# Patient Record
Sex: FEMALE | Race: WHITE | NOT HISPANIC OR LATINO | Employment: FULL TIME | URBAN - METROPOLITAN AREA
[De-identification: names, ages, dates, MRNs, and addresses within clinical notes are randomized per-mention and may not be internally consistent; named-entity substitution may affect disease eponyms.]

---

## 2019-02-04 ENCOUNTER — TELEPHONE (OUTPATIENT)
Dept: GASTROENTEROLOGY | Facility: CLINIC | Age: 24
End: 2019-02-04

## 2019-02-04 NOTE — TELEPHONE ENCOUNTER
VMM from unidentified female stating alling from Rx [facility]; states Pt is Hep C positive/over 12 wks post Harvoni tx; has ques about labs completed; req CB to [phone] or can fax to [phone]

## 2019-02-05 NOTE — TELEPHONE ENCOUNTER
I left msg on [phone] that patient should complete Hep C Viral Load lab test as ordered previously, if she needs a new lab order to contact the office

## 2019-04-18 LAB
HCV RNA SERPL NAA+PROBE-ACNC: NORMAL IU/ML
TEST INFORMATION: NORMAL

## 2021-02-17 LAB — HCV AB SER-ACNC: >11

## 2021-03-25 ENCOUNTER — TRANSCRIBE ORDERS (OUTPATIENT)
Dept: PERINATAL CARE | Facility: CLINIC | Age: 26
End: 2021-03-25

## 2021-03-25 DIAGNOSIS — O09.899 SUPERVISION OF OTHER HIGH RISK PREGNANCIES, UNSPECIFIED TRIMESTER: Primary | ICD-10-CM

## 2021-03-31 ENCOUNTER — TELEPHONE (OUTPATIENT)
Dept: GASTROENTEROLOGY | Facility: CLINIC | Age: 26
End: 2021-03-31

## 2021-03-31 NOTE — TELEPHONE ENCOUNTER
FELIX Mccloud, left [facility] requesting rec to be fax'd to [phone]  If Shelby Pitch [phone]  Called for more info/spoke w/ nurse Tanya Reid  I noted receipt of Ame's req for Med records/asked what she is looking for  Tanya Reid states Pt is pregnant again/want to discuss Hep C treatment

## 2021-04-01 NOTE — TELEPHONE ENCOUNTER
I tried calling Lindsay Kang [phone], she was not available  Clem Rosas is now on [employer]; new fax number [phone]  I tried faxing updated clinical note & lab results, but have been unsuccessful  Her last HCV lab result dated 4/13/2019- not detected  Patient completed 8 weeks of [address] for Hep C in 2018  Her follow up viral load was completed 3 months after treatment  Results showed not detected meaning that she is considered a cure of the Hep C  [place] ordered standard pregnancy blood work which included Hep C Ab; it came back positive  To ensure she has no active virus it would be recommended to follow up with a HCV PCR Quantitative

## 2021-04-02 ENCOUNTER — TELEPHONE (OUTPATIENT)
Dept: OBGYN CLINIC | Facility: CLINIC | Age: 26
End: 2021-04-02

## 2021-04-02 NOTE — TELEPHONE ENCOUNTER
Received call back from Javon at [facility], [facility]  Yifan Townsend last appt at [facility] was 4/3/2019  HCV lab result at that time hep c not detected, cleared her system   If you feel further testing is indicated they recommend you order HCV PCR quant to check for current infection

## 2021-04-02 NOTE — TELEPHONE ENCOUNTER
I left a  [phone] for the nursing staff trying to provide them with update as my fax is not going through

## 2021-04-20 ENCOUNTER — OFFICE VISIT (OUTPATIENT)
Dept: OBGYN CLINIC | Facility: CLINIC | Age: 26
End: 2021-04-20
Payer: COMMERCIAL

## 2021-04-20 VITALS
DIASTOLIC BLOOD PRESSURE: 72 MMHG | SYSTOLIC BLOOD PRESSURE: 112 MMHG | HEIGHT: 63 IN | TEMPERATURE: 98.8 F | BODY MASS INDEX: 30.12 KG/M2 | WEIGHT: 170 LBS

## 2021-04-20 DIAGNOSIS — R22.41 ANKLE MASS, RIGHT: Primary | ICD-10-CM

## 2021-04-20 PROCEDURE — 99203 OFFICE O/P NEW LOW 30 MIN: CPT | Performed by: ORTHOPAEDIC SURGERY

## 2021-04-20 NOTE — ASSESSMENT & PLAN NOTE
Findings consistent with right lateral ankle mass, possible ganglion cyst   Findings and treatment options were discussed with the patient  She was unable to get x-rays today due to being pregnant  Recommend MRI of the right ankle to further evaluate if the mass is cystic versus soft tissue mass  She will follow-up with MRI results and Dr Leonarda Hancock will go over further treatment recommendations at that time  All questions were answered to patient's satisfaction
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
## 2021-04-21 ENCOUNTER — TELEPHONE (OUTPATIENT)
Dept: OBGYN CLINIC | Facility: CLINIC | Age: 26
End: 2021-04-21

## 2021-04-21 ENCOUNTER — ROUTINE PRENATAL (OUTPATIENT)
Dept: PERINATAL CARE | Facility: CLINIC | Age: 26
End: 2021-04-21
Payer: COMMERCIAL

## 2021-04-21 VITALS
SYSTOLIC BLOOD PRESSURE: 99 MMHG | BODY MASS INDEX: 30.3 KG/M2 | WEIGHT: 171 LBS | HEART RATE: 60 BPM | HEIGHT: 63 IN | DIASTOLIC BLOOD PRESSURE: 67 MMHG

## 2021-04-21 DIAGNOSIS — O02.1 MISSED ABORTION WITH FETAL DEMISE BEFORE 20 COMPLETED WEEKS OF GESTATION: ICD-10-CM

## 2021-04-21 DIAGNOSIS — R22.41 ANKLE MASS, RIGHT: Primary | ICD-10-CM

## 2021-04-21 DIAGNOSIS — Z36.86 ENCOUNTER FOR ANTENATAL SCREENING FOR CERVICAL LENGTH: ICD-10-CM

## 2021-04-21 DIAGNOSIS — Z3A.20 20 WEEKS GESTATION OF PREGNANCY: ICD-10-CM

## 2021-04-21 DIAGNOSIS — O34.211 MATERNAL CARE DUE TO LOW TRANSVERSE UTERINE SCAR FROM PREVIOUS CESAREAN DELIVERY: Primary | ICD-10-CM

## 2021-04-21 DIAGNOSIS — R76.8 HEPATITIS C ANTIBODY POSITIVE IN BLOOD: ICD-10-CM

## 2021-04-21 DIAGNOSIS — O09.899 SUPERVISION OF OTHER HIGH RISK PREGNANCIES, UNSPECIFIED TRIMESTER: ICD-10-CM

## 2021-04-21 PROCEDURE — 76817 TRANSVAGINAL US OBSTETRIC: CPT | Performed by: OBSTETRICS & GYNECOLOGY

## 2021-04-21 PROCEDURE — 99242 OFF/OP CONSLTJ NEW/EST SF 20: CPT | Performed by: OBSTETRICS & GYNECOLOGY

## 2021-04-21 PROCEDURE — 76811 OB US DETAILED SNGL FETUS: CPT | Performed by: OBSTETRICS & GYNECOLOGY

## 2021-04-21 NOTE — PROGRESS NOTES
OFFICE CONSULT  [address] [place] [address] [place] [address],  [address] [place] [address]     Dear Dr Nikki Galloway     Thank you for requesting a  consultation on your patient Jerrod Oliva for the following indications:  20 week anatomy scan    History   Berna Stone is on prenatal vitamins and reports no known drug allergies  Her medical history includes prior history of drug abuse under remission  She also has a have a history of hepatitis C treated in the past and now has a negative viral load  Lastly she has a history of asthma currently controlled on no medication and history of bipolar depression and anxiety again currently controlled on no medication  Surgical history includes prior low-transverse  and mouth surgery  She is a former smoker and a former user of cocaine, marijuana, suboxone and heroin which is currently under remission  A sequential screen was done in this pregnancy is was reported to me as normal by Dr Nikki Galloway  A urine drug screen this pregnancy was negative  Blood type is O negative with a negative antibody screen  Ultrasound findings: Her ultrasound today shows a fetus that is lagging behind her dates and is currently nonviable without a heart rate  This appears to have occurred relatively recently because the internal structures of the cranial anatomy are still visualized and appear normal   There is some edema behind the back of the baby's neck which can be seen in pregnancies in which there is a demise  There is adequate amniotic fluid  Her cervical length is long  There is no sign of placenta previa  There is no sign of an abruption  The fetus measures 17 weeks and 3 days  The patient was informed of the findings and counseled about the limitations of the exam in detecting all forms of fetal congenital abnormalities  She does not report any vaginal bleeding or uterine cramping/contractions  She does feel fetal movement        Specific counseling was provided on the following problems:  1  We discussed the findings today of the missed / fetal demise less than 20 weeks  An etiology is not obvious  Options for delivery include a D&E verses an induction  Both can be preceded by laminaria to improve the ease and success rate of both procedures  Fetal chromosomes by microarray can be considered to look for a possible missed chromosome problem that could have been a cause behind today's loss  Other options for workup may include screening for maternal blood type and screen to look for new onset of antibodies, screen for hypothyroidism, diabetes, antiphospholipid antibodies, torch titers and drug screen  It is difficult to tell when the demise occurred  If it was recent a + Georgette Bettencourthellen may show evidence of a fetal maternal  Transfusion and fetal anemia as a cause for a demise  Follow up recommended:   I had contacted her OB provider Dr Atul Sepulveda and he will call her later on today to discuss options for delivery  At this time she was leaning towards an induction so that she could see the baby after delivery and see if there is any outward signs of malformations that we were unable to detect today  She is aware that approximately 1 in 6 inductions may require a D&E for retained placenta  The majority of time (greater then 50%) was spent on counseling and coordination of care of this patient and /or family member  Approximate face to face time and coordination of care was 30 minutes            Chan Saldaña MD

## 2021-04-21 NOTE — TELEPHONE ENCOUNTER
Janey Lane from [place] [address] called requesting results of NT scan  Printed NT information from [facility], faxed to [facility]-patient there for appointment now   Fax [phone]

## 2021-04-22 ENCOUNTER — ANESTHESIA EVENT (OUTPATIENT)
Dept: PERIOP | Facility: HOSPITAL | Age: 26
End: 2021-04-22
Payer: COMMERCIAL

## 2021-04-22 ENCOUNTER — OFFICE VISIT (OUTPATIENT)
Dept: OBGYN CLINIC | Facility: MEDICAL CENTER | Age: 26
End: 2021-04-22

## 2021-04-22 VITALS
DIASTOLIC BLOOD PRESSURE: 62 MMHG | SYSTOLIC BLOOD PRESSURE: 102 MMHG | HEIGHT: 63 IN | WEIGHT: 167.8 LBS | BODY MASS INDEX: 29.73 KG/M2

## 2021-04-22 DIAGNOSIS — O34.211 MATERNAL CARE DUE TO LOW TRANSVERSE UTERINE SCAR FROM PREVIOUS CESAREAN DELIVERY: ICD-10-CM

## 2021-04-22 DIAGNOSIS — O02.1 MISSED ABORTION WITH FETAL DEMISE BEFORE 20 COMPLETED WEEKS OF GESTATION: Primary | ICD-10-CM

## 2021-04-22 DIAGNOSIS — R76.8 HEPATITIS C ANTIBODY POSITIVE IN BLOOD: ICD-10-CM

## 2021-04-22 PROCEDURE — PREOP: Performed by: OBSTETRICS & GYNECOLOGY

## 2021-04-22 RX ORDER — ALBUTEROL SULFATE 90 UG/1
2 AEROSOL, METERED RESPIRATORY (INHALATION) EVERY 6 HOURS PRN
COMMUNITY

## 2021-04-22 RX ORDER — IBUPROFEN 600 MG/1
600 TABLET ORAL EVERY 6 HOURS PRN
Qty: 50 TABLET | Refills: 1 | Status: SHIPPED | OUTPATIENT
Start: 2021-04-22 | End: 2022-04-08

## 2021-04-22 RX ORDER — MISOPROSTOL 200 UG/1
TABLET ORAL
Qty: 4 TABLET | Refills: 0 | Status: SHIPPED | OUTPATIENT
Start: 2021-04-22 | End: 2021-04-23 | Stop reason: HOSPADM

## 2021-04-22 RX ORDER — DOXYCYCLINE 100 MG/1
100 TABLET ORAL 2 TIMES DAILY
Qty: 4 TABLET | Refills: 0 | Status: SHIPPED | OUTPATIENT
Start: 2021-04-22 | End: 2021-04-24

## 2021-04-22 NOTE — PROGRESS NOTES
Cervical dilation     Date/Time 4/22/2021 1:16 PM     Performed by  Sumaya Toure MD     Authorized by Sumaya Toure MD      Universal Protocol   Consent: Verbal consent obtained  Written consent obtained  Risks and benefits: risks, benefits and alternatives were discussed  Consent given by: patient  Patient understanding: patient states understanding of the procedure being performed  Patient consent: the patient's understanding of the procedure matches consent given  Procedure consent: procedure consent does not match procedure scheduled (decision made in visit)  Required items: required blood products, implants, devices, and special equipment available        Local anesthesia used: yes      Anesthesia: nerve block (paracervical)     Anesthesia   Local anesthesia used: yes  Local Anesthetic: lidocaine 1% without epinephrine  Anesthetic total: 10 mL     Sedation   Patient sedated: no        Specimen: no    Culture: no   Procedure Details   Procedure Notes: Patient was positioned in dorsal lithotomy position and cervix was visualized using speculum  A single toothed tenaculum was placed across the anterior lip of the cervix  A paracervical block was administered with 5mL of lidocaine 1% on each side  Cervix was cleansed with betadine  Laminaria were placed sequentially in the cervix under sterile technique with a ring forceps to a maximum capacity of 6 laminaria  Single toothed tenaculum was removed from the cervix and good hemostasis was noted  Two water-moistened gauze sponges were placed in the vagina as well  Speculum was removed  Patient tolerated the procedure well with no immediate complications  Instructed to take doxycycline 100mg q12hr until surgery  OK to take with small sip of water while NPO

## 2021-04-22 NOTE — PRE-PROCEDURE INSTRUCTIONS
Pre-Surgery Instructions:   Medication Instructions    albuterol (PROVENTIL HFA,VENTOLIN HFA) 90 mcg/act inhaler Instructed patient per Anesthesia Guidelines   doxycycline (ADOXA) 100 MG tablet Patient was instructed by Physician and understands   ibuprofen (MOTRIN) 600 mg tablet Patient was instructed by Physician and understands   misoprostol (CYTOTEC) 200 mcg tablet Patient was instructed by Physician and understands   Prenatal Vit-Fe Fumarate-FA (PRENATAL VITAMIN PO) Patient was instructed by Physician and understands  Instructed per surgeon to take doxycycline am of surgery with sip of water and misoprostol 2 hours prior to surgery  Can use albuterol  inhaler am of surgery if needed per anesthesia guidelines  Patient/Caregiver provided printed discharge information.
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
## 2021-04-23 ENCOUNTER — ANESTHESIA (OUTPATIENT)
Dept: PERIOP | Facility: HOSPITAL | Age: 26
End: 2021-04-23
Payer: COMMERCIAL

## 2021-04-23 ENCOUNTER — HOSPITAL ENCOUNTER (OUTPATIENT)
Facility: HOSPITAL | Age: 26
Setting detail: OUTPATIENT SURGERY
Discharge: HOME | End: 2021-04-23
Attending: OBSTETRICS & GYNECOLOGY | Admitting: OBSTETRICS & GYNECOLOGY
Payer: COMMERCIAL

## 2021-04-23 VITALS
TEMPERATURE: 98.6 F | OXYGEN SATURATION: 98 % | WEIGHT: 167 LBS | RESPIRATION RATE: 16 BRPM | SYSTOLIC BLOOD PRESSURE: 100 MMHG | HEIGHT: 63 IN | DIASTOLIC BLOOD PRESSURE: 54 MMHG | BODY MASS INDEX: 29.59 KG/M2 | HEART RATE: 92 BPM

## 2021-04-23 DIAGNOSIS — O02.1 MISSED ABORTION WITH FETAL DEMISE BEFORE 20 COMPLETED WEEKS OF GESTATION: ICD-10-CM

## 2021-04-23 LAB
ABO GROUP BLD: NORMAL
AMPHETAMINES SERPL QL SCN: NEGATIVE
BARBITURATES UR QL: NEGATIVE
BENZODIAZ UR QL: NEGATIVE
BLD GP AB SCN SERPL QL: NEGATIVE
BLD GP AB SCN SERPL QL: NEGATIVE
COCAINE UR QL: NEGATIVE
ERYTHROCYTE [DISTWIDTH] IN BLOOD BY AUTOMATED COUNT: 12.2 % (ref 11.6–15.1)
HCT VFR BLD AUTO: 39.1 % (ref 34.8–46.1)
HGB BLD-MCNC: 13.2 G/DL (ref 11.5–15.4)
MCH RBC QN AUTO: 33.2 PG (ref 26.8–34.3)
MCHC RBC AUTO-ENTMCNC: 33.8 G/DL (ref 31.4–37.4)
MCV RBC AUTO: 98 FL (ref 82–98)
METHADONE UR QL: NEGATIVE
OPIATES UR QL SCN: NEGATIVE
OXYCODONE+OXYMORPHONE UR QL SCN: NEGATIVE
PCP UR QL: NEGATIVE
PLATELET # BLD AUTO: 241 THOUSANDS/UL (ref 149–390)
PMV BLD AUTO: 10.8 FL (ref 8.9–12.7)
RBC # BLD AUTO: 3.98 MILLION/UL (ref 3.81–5.12)
RH BLD: NEGATIVE
SPECIMEN EXPIRATION DATE: NORMAL
THC UR QL: NEGATIVE
TSH SERPL DL<=0.05 MIU/L-ACNC: 1.35 UIU/ML (ref 0.36–3.74)
WBC # BLD AUTO: 9.27 THOUSAND/UL (ref 4.31–10.16)

## 2021-04-23 PROCEDURE — 59821 TREATMENT OF MISCARRIAGE: CPT | Performed by: OBSTETRICS & GYNECOLOGY

## 2021-04-23 PROCEDURE — 86695 HERPES SIMPLEX TYPE 1 TEST: CPT | Performed by: OBSTETRICS & GYNECOLOGY

## 2021-04-23 PROCEDURE — 86694 HERPES SIMPLEX NES ANTBDY: CPT | Performed by: OBSTETRICS & GYNECOLOGY

## 2021-04-23 PROCEDURE — 86850 RBC ANTIBODY SCREEN: CPT | Performed by: OBSTETRICS & GYNECOLOGY

## 2021-04-23 PROCEDURE — 86762 RUBELLA ANTIBODY: CPT | Performed by: OBSTETRICS & GYNECOLOGY

## 2021-04-23 PROCEDURE — 86778 TOXOPLASMA ANTIBODY IGM: CPT | Performed by: OBSTETRICS & GYNECOLOGY

## 2021-04-23 PROCEDURE — 88305 TISSUE EXAM BY PATHOLOGIST: CPT | Performed by: PATHOLOGY

## 2021-04-23 PROCEDURE — 80307 DRUG TEST PRSMV CHEM ANLYZR: CPT | Performed by: OBSTETRICS & GYNECOLOGY

## 2021-04-23 PROCEDURE — 86900 BLOOD TYPING SEROLOGIC ABO: CPT | Performed by: OBSTETRICS & GYNECOLOGY

## 2021-04-23 PROCEDURE — 86644 CMV ANTIBODY: CPT | Performed by: OBSTETRICS & GYNECOLOGY

## 2021-04-23 PROCEDURE — 86901 BLOOD TYPING SEROLOGIC RH(D): CPT | Performed by: OBSTETRICS & GYNECOLOGY

## 2021-04-23 PROCEDURE — 85027 COMPLETE CBC AUTOMATED: CPT | Performed by: OBSTETRICS & GYNECOLOGY

## 2021-04-23 PROCEDURE — 86696 HERPES SIMPLEX TYPE 2 TEST: CPT | Performed by: OBSTETRICS & GYNECOLOGY

## 2021-04-23 PROCEDURE — 86147 CARDIOLIPIN ANTIBODY EA IG: CPT | Performed by: OBSTETRICS & GYNECOLOGY

## 2021-04-23 PROCEDURE — 85705 THROMBOPLASTIN INHIBITION: CPT | Performed by: OBSTETRICS & GYNECOLOGY

## 2021-04-23 PROCEDURE — 86777 TOXOPLASMA ANTIBODY: CPT | Performed by: OBSTETRICS & GYNECOLOGY

## 2021-04-23 PROCEDURE — 85732 THROMBOPLASTIN TIME PARTIAL: CPT | Performed by: OBSTETRICS & GYNECOLOGY

## 2021-04-23 PROCEDURE — 86645 CMV ANTIBODY IGM: CPT | Performed by: OBSTETRICS & GYNECOLOGY

## 2021-04-23 PROCEDURE — 85613 RUSSELL VIPER VENOM DILUTED: CPT | Performed by: OBSTETRICS & GYNECOLOGY

## 2021-04-23 PROCEDURE — 84443 ASSAY THYROID STIM HORMONE: CPT | Performed by: OBSTETRICS & GYNECOLOGY

## 2021-04-23 PROCEDURE — 85460 HEMOGLOBIN FETAL: CPT | Performed by: OBSTETRICS & GYNECOLOGY

## 2021-04-23 PROCEDURE — 85670 THROMBIN TIME PLASMA: CPT | Performed by: OBSTETRICS & GYNECOLOGY

## 2021-04-23 PROCEDURE — 83036 HEMOGLOBIN GLYCOSYLATED A1C: CPT | Performed by: OBSTETRICS & GYNECOLOGY

## 2021-04-23 RX ORDER — METHYLERGONOVINE MALEATE 0.2 MG/ML
INJECTION INTRAVENOUS AS NEEDED
Status: DISCONTINUED | OUTPATIENT
Start: 2021-04-23 | End: 2021-04-23

## 2021-04-23 RX ORDER — FENTANYL CITRATE/PF 50 MCG/ML
25 SYRINGE (ML) INJECTION
Status: DISCONTINUED | OUTPATIENT
Start: 2021-04-23 | End: 2021-04-23 | Stop reason: HOSPADM

## 2021-04-23 RX ORDER — IBUPROFEN 600 MG/1
600 TABLET ORAL EVERY 6 HOURS PRN
Qty: 30 TABLET | Refills: 0 | Status: SHIPPED | OUTPATIENT
Start: 2021-04-23 | End: 2021-08-17 | Stop reason: SDUPTHER

## 2021-04-23 RX ORDER — DEXAMETHASONE SODIUM PHOSPHATE 4 MG/ML
INJECTION, SOLUTION INTRA-ARTICULAR; INTRALESIONAL; INTRAMUSCULAR; INTRAVENOUS; SOFT TISSUE AS NEEDED
Status: DISCONTINUED | OUTPATIENT
Start: 2021-04-23 | End: 2021-04-23

## 2021-04-23 RX ORDER — FENTANYL CITRATE 50 UG/ML
INJECTION, SOLUTION INTRAMUSCULAR; INTRAVENOUS AS NEEDED
Status: DISCONTINUED | OUTPATIENT
Start: 2021-04-23 | End: 2021-04-23

## 2021-04-23 RX ORDER — ONDANSETRON 2 MG/ML
4 INJECTION INTRAMUSCULAR; INTRAVENOUS ONCE AS NEEDED
Status: COMPLETED | OUTPATIENT
Start: 2021-04-23 | End: 2021-04-23

## 2021-04-23 RX ORDER — OXYCODONE HYDROCHLORIDE 5 MG/1
5 TABLET ORAL EVERY 4 HOURS PRN
Status: CANCELLED | OUTPATIENT
Start: 2021-04-23

## 2021-04-23 RX ORDER — PROPOFOL 10 MG/ML
INJECTION, EMULSION INTRAVENOUS AS NEEDED
Status: DISCONTINUED | OUTPATIENT
Start: 2021-04-23 | End: 2021-04-23

## 2021-04-23 RX ORDER — MAGNESIUM HYDROXIDE 1200 MG/15ML
LIQUID ORAL AS NEEDED
Status: DISCONTINUED | OUTPATIENT
Start: 2021-04-23 | End: 2021-04-23 | Stop reason: HOSPADM

## 2021-04-23 RX ORDER — LIDOCAINE HYDROCHLORIDE 20 MG/ML
INJECTION, SOLUTION EPIDURAL; INFILTRATION; INTRACAUDAL; PERINEURAL AS NEEDED
Status: DISCONTINUED | OUTPATIENT
Start: 2021-04-23 | End: 2021-04-23

## 2021-04-23 RX ORDER — ONDANSETRON 2 MG/ML
4 INJECTION INTRAMUSCULAR; INTRAVENOUS EVERY 6 HOURS PRN
Status: DISCONTINUED | OUTPATIENT
Start: 2021-04-23 | End: 2021-04-23 | Stop reason: HOSPADM

## 2021-04-23 RX ORDER — MIDAZOLAM HYDROCHLORIDE 2 MG/2ML
INJECTION, SOLUTION INTRAMUSCULAR; INTRAVENOUS AS NEEDED
Status: DISCONTINUED | OUTPATIENT
Start: 2021-04-23 | End: 2021-04-23

## 2021-04-23 RX ORDER — OXYCODONE HYDROCHLORIDE 5 MG/1
10 TABLET ORAL EVERY 4 HOURS PRN
Status: CANCELLED | OUTPATIENT
Start: 2021-04-23

## 2021-04-23 RX ORDER — SODIUM CHLORIDE 9 MG/ML
125 INJECTION, SOLUTION INTRAVENOUS CONTINUOUS
Status: DISCONTINUED | OUTPATIENT
Start: 2021-04-23 | End: 2021-04-23

## 2021-04-23 RX ORDER — IBUPROFEN 600 MG/1
600 TABLET ORAL EVERY 6 HOURS SCHEDULED
Status: DISCONTINUED | OUTPATIENT
Start: 2021-04-23 | End: 2021-04-23 | Stop reason: HOSPADM

## 2021-04-23 RX ORDER — CARBOPROST TROMETHAMINE 250 UG/ML
INJECTION, SOLUTION INTRAMUSCULAR AS NEEDED
Status: DISCONTINUED | OUTPATIENT
Start: 2021-04-23 | End: 2021-04-23

## 2021-04-23 RX ORDER — PROMETHAZINE HYDROCHLORIDE 25 MG/ML
12.5 INJECTION, SOLUTION INTRAMUSCULAR; INTRAVENOUS ONCE AS NEEDED
Status: COMPLETED | OUTPATIENT
Start: 2021-04-23 | End: 2021-04-23

## 2021-04-23 RX ORDER — ACETAMINOPHEN 325 MG/1
650 TABLET ORAL EVERY 6 HOURS SCHEDULED
Status: DISCONTINUED | OUTPATIENT
Start: 2021-04-23 | End: 2021-04-23 | Stop reason: HOSPADM

## 2021-04-23 RX ORDER — ONDANSETRON 2 MG/ML
INJECTION INTRAMUSCULAR; INTRAVENOUS AS NEEDED
Status: DISCONTINUED | OUTPATIENT
Start: 2021-04-23 | End: 2021-04-23

## 2021-04-23 RX ORDER — SUCCINYLCHOLINE/SOD CL,ISO/PF 100 MG/5ML
SYRINGE (ML) INTRAVENOUS AS NEEDED
Status: DISCONTINUED | OUTPATIENT
Start: 2021-04-23 | End: 2021-04-23

## 2021-04-23 RX ORDER — ALBUTEROL SULFATE 2.5 MG/3ML
2.5 SOLUTION RESPIRATORY (INHALATION) ONCE AS NEEDED
Status: DISCONTINUED | OUTPATIENT
Start: 2021-04-23 | End: 2021-04-23 | Stop reason: HOSPADM

## 2021-04-23 RX ADMIN — CARBOPROST TROMETHAMINE 250 MCG: 250 INJECTION, SOLUTION INTRAMUSCULAR at 15:23

## 2021-04-23 RX ADMIN — FENTANYL CITRATE 50 MCG: 50 INJECTION INTRAMUSCULAR; INTRAVENOUS at 13:55

## 2021-04-23 RX ADMIN — MIDAZOLAM 2 MG: 1 INJECTION INTRAMUSCULAR; INTRAVENOUS at 13:33

## 2021-04-23 RX ADMIN — FENTANYL CITRATE 50 MCG: 50 INJECTION INTRAMUSCULAR; INTRAVENOUS at 15:29

## 2021-04-23 RX ADMIN — SODIUM CHLORIDE 125 ML/HR: 0.9 INJECTION, SOLUTION INTRAVENOUS at 12:39

## 2021-04-23 RX ADMIN — METHYLERGONOVINE MALEATE 0.2 MG: 0.2 INJECTION, SOLUTION INTRAMUSCULAR; INTRAVENOUS at 15:10

## 2021-04-23 RX ADMIN — HUMAN RHO(D) IMMUNE GLOBULIN 300 MCG: 300 INJECTION, SOLUTION INTRAMUSCULAR at 16:48

## 2021-04-23 RX ADMIN — PROMETHAZINE HYDROCHLORIDE 12.5 MG: 25 INJECTION INTRAMUSCULAR; INTRAVENOUS at 16:40

## 2021-04-23 RX ADMIN — SODIUM CHLORIDE: 0.9 INJECTION, SOLUTION INTRAVENOUS at 14:02

## 2021-04-23 RX ADMIN — DEXAMETHASONE SODIUM PHOSPHATE 4 MG: 4 INJECTION INTRA-ARTICULAR; INTRALESIONAL; INTRAMUSCULAR; INTRAVENOUS; SOFT TISSUE at 13:45

## 2021-04-23 RX ADMIN — DOXYCYCLINE 200 MG: 100 INJECTION, POWDER, LYOPHILIZED, FOR SOLUTION INTRAVENOUS at 18:34

## 2021-04-23 RX ADMIN — IBUPROFEN 600 MG: 600 TABLET, FILM COATED ORAL at 17:44

## 2021-04-23 RX ADMIN — PROPOFOL 200 MG: 10 INJECTION, EMULSION INTRAVENOUS at 13:42

## 2021-04-23 RX ADMIN — ONDANSETRON 4 MG: 2 INJECTION INTRAMUSCULAR; INTRAVENOUS at 13:45

## 2021-04-23 RX ADMIN — ONDANSETRON 4 MG: 2 INJECTION INTRAMUSCULAR; INTRAVENOUS at 15:56

## 2021-04-23 RX ADMIN — Medication 100 MG: at 13:36

## 2021-04-23 RX ADMIN — SODIUM CHLORIDE: 0.9 INJECTION, SOLUTION INTRAVENOUS at 14:52

## 2021-04-23 RX ADMIN — FENTANYL CITRATE 100 MCG: 50 INJECTION INTRAMUSCULAR; INTRAVENOUS at 13:42

## 2021-04-23 RX ADMIN — LIDOCAINE HYDROCHLORIDE 100 MG: 20 INJECTION, SOLUTION EPIDURAL; INFILTRATION; INTRACAUDAL; PERINEURAL at 13:42

## 2021-04-23 RX ADMIN — FENTANYL CITRATE 50 MCG: 50 INJECTION INTRAMUSCULAR; INTRAVENOUS at 14:02

## 2021-04-23 NOTE — OP NOTE
OPERATIVE REPORT  PATIENT NAME: Jessi Brambila    :  1995  MRN: [id number]  Pt Location: [facility] OR ROOM 06    SURGERY DATE: 2021    Surgeon(s) and Role:     * Shiv Arnold MD - Primary     * Marty Lofton MD - [place] [address], DO - Assisting    Preop Diagnosis:  Missed  with fetal demise before 20 completed weeks of gestation [O02 1]    Post-Op Diagnosis Codes:     * Missed  with fetal demise before 21 completed weeks of gestation [O02 1]    Procedure(s) (LRB):  DILATATION AND EVACUATION (D&E) (17 OF WEEKS) (N/A)   Under ultrasound guidance    Specimen(s):  ID Type Source Tests Collected by Time Destination   1 : gestational weeks=17 weeks fetal demise - see comments Tissue Products of Conception TISSUE EXAM Shiv Arnold MD 2021 [id number]        Estimated Blood Loss:   700 mL    Drains:  * No LDAs found *    Anesthesia Type:   General ETT    Operative Indications:  Missed  with fetal demise before 20 completed weeks of gestation [O02 1]    Operative Findings:  1  Ultrasound assessment notable for intrauterine pregnancy with absent FHT  2  6 laminaria and 2 gauze sponges removed from vagina  SVE 3cm externally, tight 1cm internally  3  Yellow tinted amniotic fluid without discrete odor  4  Complete evacuation noted by ultrasound assessment and direct inspection of POC  Male fetus noted  Complications:   None    Procedure and Technique:  Description of Procedure    Patient was taken to the operating room were a time out was performed to confirm correct patient and correct procedure  General endotracheal anesthesia (GET) was administered and the patient was positioned on the OR table in the dorsal lithotomy position  All pressure points were padded and a jo-ann hugger was placed to maintain control of core body temperature  Exam was performed and the uterus was noted to be gravid with intrauterine, non-viable pregnancy (by US)   Laminaria removed and internal os noted to be 1cm dilated  The patient was prepped and draped in the usual sterile fashion  Operative Technique    A straight catheter was introduced into the bladder, which was drained of 75cc of clear yellow urine  A weighted speculum was inserted into the vagina and a Rd retractor was used to visualize the anterior lip of the cervix, which was then grasped with a single toothed tenaculum  The cervix was serially dilated to 43F using Harris dilators  Membranes were ruptured for yellow tinted fluid  Products of conception were removed from uterine cavity with a combination of extraction and suction techniques under ultrasound guidance  Following complete evacuation, slow collection of blood was noted in the lower uterine segment  A gentle 4 quadrant curettage was performed and yielded no tissue  Suction curettage with a 16 suction curette was performed and removed blood and clotted blood, but again no further tissue  Methergine 0 2mg IM was administered with some improvement in uterine tone, but still slow collection  Hemabate 250mcg IM was administered and uterine massage performed  There was resolution of collection  The single toothed tenaculum was removed from the anterior lip of the cervix  Good hemostasis was confirmed at the tenaculum puncture sites  Speculum was then removed from the vagina  At the conclusion of the procedure, all needle, sponge, and instrument counts were noted to be correct x2  Patient tolerated the procedure well and was transferred to PACU in stable condition prior to discharge with follow up in 1-2 weeks  I was present and participated in the entire case      Patient Disposition:  PACU  and hemodynamically stable    SIGNATURE: Brian Pedroza MD  DATE: April 23, 2021  TIME: 4:50 PM

## 2021-04-23 NOTE — DISCHARGE INSTRUCTIONS
Miscarriage   WHAT YOU NEED TO KNOW:   A miscarriage is the loss of a fetus within the first 20 weeks of pregnancy  A miscarriage may also be called a spontaneous  or an early pregnancy loss  DISCHARGE INSTRUCTIONS:   Seek care immediately if:   · You have foul-smelling drainage or pus coming from your vagina  · You have heavy vaginal bleeding and soak 1 pad or more in an hour  · You have severe abdominal pain  · You feel like your heart is beating faster than normal      · You feel extremely weak or dizzy  Contact your healthcare provider if:   · You have a fever greater than 100 4°F or chills  · You have extreme sadness, grief, or feel unable to cope with what has happened  · You have questions or concerns about your condition or care  Self-care:   · Do not put anything in your vagina for 2 weeks or as directed  Do not use tampons, douche, or have sex  These actions can cause infection and pain  · Use sanitary pads as needed  You may have light bleeding or spotting for 2 weeks  · Do not take a bath or go swimming for 2 weeks or as directed  These actions may increase your risk for an infection  Take showers only  · Rest as needed  Slowly start to do more each day  Return to your daily activities as directed  · Talk to your healthcare provider about birth control  If you would like to prevent another pregnancy, ask your healthcare provider which type of birth control is best for you  · Join a support group or therapy to help you cope  A miscarriage may be very difficult for you, your partner, and other members of your family  There is no right way to feel after a miscarriage  You may feel overwhelming grief or other emotions  It may be helpful to talk to a friend, family member, or counselor about your feelings  You may worry that you could have another miscarriage  Talk to your healthcare provider about your concerns   He may be able to help you reduce the risk for another miscarriage  He may also help you find ways to cope with grief  For more information:   · The [address] [place] [address] of Obstetricians and Gynecologists  [address] [place] [address] , [address] [place] [address]  Phone: [phone]  Phone: [phone]  Web Address: http://[employer]/  org    · [facility]  [facility] , [address] [place] [address]  Web Address: [employer]  Follow up with your healthcare provider as directed: You may need to see your healthcare provider for blood tests or an ultrasound  Write down your questions so you remember to ask them during your visits  © Copyright [address] Information is for End User's use only and may not be sold, redistributed or otherwise used for commercial purposes  All illustrations and images included in CareNotes® are the copyrighted property of [employer]  or [address] [place] [address]  The above information is an  only  It is not intended as medical advice for individual conditions or treatments  Talk to your doctor, nurse or pharmacist before following any medical regimen to see if it is safe and effective for you

## 2021-04-23 NOTE — PROGRESS NOTES
Home for disposition of fetal/ remains,  Home:  [facility] in [address]  Phone #[phone]

## 2021-04-23 NOTE — INTERVAL H&P NOTE
H&P reviewed  After examining the patient I find no changes in the patients condition since the H&P had been written      Vitals:    04/23/21 1132   BP: 106/71   Pulse: 74   Temp: 99 2 °F (37 3 °C)   SpO2: 100%

## 2021-04-23 NOTE — ANESTHESIA PREPROCEDURE EVALUATION
Procedure:  DILATATION AND EVACUATION (D&E) (17 OF WEEKS) (N/A Uterus)    Relevant Problems   ANESTHESIA (within normal limits)      PULMONARY   (+) Asthma      Other   (+) Maternal care due to low transverse uterine scar from previous  delivery   (+) Missed  with fetal demise before 20 completed weeks of gestation        Physical Exam    Airway    Mallampati score: II  TM Distance: >3 FB  Neck ROM: full     Dental       Cardiovascular  Rhythm: regular, Rate: normal,     Pulmonary  Breath sounds clear to auscultation,     Other Findings        Anesthesia Plan  ASA Score- 2     Anesthesia Type- general with ASA Monitors  Additional Monitors:   Airway Plan: ETT  Plan Factors-Exercise tolerance (METS): >4 METS  Chart reviewed  Patient summary reviewed  Patient is not a current smoker  Induction- intravenous  Postoperative Plan-     Informed Consent- Anesthetic plan and risks discussed with patient

## 2021-04-24 LAB
EST. AVERAGE GLUCOSE BLD GHB EST-MCNC: 88 MG/DL
FETAL RBC/1000 RBC BLD KLEIH BETKE-RTO: 0 % (ref 0–1)
HBA1C MFR BLD: 4.7 %

## 2021-04-25 LAB
APTT SCREEN TO CONFIRM RATIO: 1.09 RATIO (ref 0–1.4)
CONFIRM APTT/NORMAL: 31.8 SEC (ref 0–55)
LA PPP-IMP: NORMAL
SCREEN APTT: 35.6 SEC (ref 0–51.9)
SCREEN DRVVT: 34 SEC (ref 0–47)
THROMBIN TIME: 16.2 SEC (ref 0–23)

## 2021-04-26 LAB
CARDIOLIPIN IGA SER IA-ACNC: 0.9
CARDIOLIPIN IGG SER IA-ACNC: 0.5
CARDIOLIPIN IGM SER IA-ACNC: 1

## 2021-04-27 LAB
CMV IGG SERPL IA-ACNC: 6.9 U/ML (ref 0–0.59)
CMV IGM SERPL IA-ACNC: <30 AU/ML (ref 0–29.9)
COMMENT 1 FOR TOXO IGM QNT: NORMAL
HSV1 IGG SER IA-ACNC: <0.91 INDEX (ref 0–0.9)
HSV1+2 IGM SER IA-ACNC: <0.91 RATIO (ref 0–0.9)
HSV2 IGG SER IA-ACNC: <0.91 INDEX (ref 0–0.9)
RUBV IGG SERPL IA-ACNC: 1.78 INDEX
RUBV IGM SER IA-ACNC: <20 AU/ML (ref 0–19.9)
T GONDII IGG SERPL IA-ACNC: <3 IU/ML (ref 0–7.1)
T GONDII IGM SER IA-ACNC: 5 AU/ML (ref 0–7.9)

## 2021-04-28 ENCOUNTER — HOSPITAL ENCOUNTER (OUTPATIENT)
Dept: ULTRASOUND IMAGING | Facility: HOSPITAL | Age: 26
Discharge: HOME | End: 2021-04-28
Attending: ORTHOPAEDIC SURGERY
Payer: COMMERCIAL

## 2021-04-28 DIAGNOSIS — R22.41 ANKLE MASS, RIGHT: ICD-10-CM

## 2021-04-28 PROCEDURE — 76882 US LMTD JT/FCL EVL NVASC XTR: CPT

## 2021-04-30 ENCOUNTER — OFFICE VISIT (OUTPATIENT)
Dept: OBGYN CLINIC | Facility: CLINIC | Age: 26
End: 2021-04-30
Payer: COMMERCIAL

## 2021-04-30 VITALS
WEIGHT: 167 LBS | BODY MASS INDEX: 29.59 KG/M2 | TEMPERATURE: 98.5 F | HEIGHT: 63 IN | DIASTOLIC BLOOD PRESSURE: 72 MMHG | SYSTOLIC BLOOD PRESSURE: 120 MMHG

## 2021-04-30 DIAGNOSIS — M67.469: Primary | ICD-10-CM

## 2021-04-30 PROCEDURE — 99213 OFFICE O/P EST LOW 20 MIN: CPT | Performed by: ORTHOPAEDIC SURGERY

## 2021-04-30 PROCEDURE — 20612 ASPIRATE/INJ GANGLION CYST: CPT | Performed by: ORTHOPAEDIC SURGERY

## 2021-04-30 RX ORDER — BETAMETHASONE SODIUM PHOSPHATE AND BETAMETHASONE ACETATE 3; 3 MG/ML; MG/ML
3 INJECTION, SUSPENSION INTRA-ARTICULAR; INTRALESIONAL; INTRAMUSCULAR; SOFT TISSUE
Status: COMPLETED | OUTPATIENT
Start: 2021-04-30 | End: 2021-04-30

## 2021-04-30 RX ORDER — LIDOCAINE HYDROCHLORIDE 10 MG/ML
0.5 INJECTION, SOLUTION INFILTRATION; PERINEURAL
Status: COMPLETED | OUTPATIENT
Start: 2021-04-30 | End: 2021-04-30

## 2021-04-30 RX ADMIN — BETAMETHASONE SODIUM PHOSPHATE AND BETAMETHASONE ACETATE 3 MG: 3; 3 INJECTION, SUSPENSION INTRA-ARTICULAR; INTRALESIONAL; INTRAMUSCULAR; SOFT TISSUE at 15:06

## 2021-04-30 RX ADMIN — LIDOCAINE HYDROCHLORIDE 0.5 ML: 10 INJECTION, SOLUTION INFILTRATION; PERINEURAL at 15:06
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
## 2021-04-30 NOTE — ASSESSMENT & PLAN NOTE
Findings consistent with right ankle ganglion cyst over the peroneal tendon  Discussed findings and treatment options with the patient  I reviewed patient's ultrasound with patient  I discussed prognosis of her ankle condition  Patient elected to try conservative treatment with aspiration and cortisone injection 1st   I informed patient possible recurrence of the ganglion cyst with aspiration  Cold compress over the injection site  I will see patient back in 6 weeks or as needed  All patient's questions were answered to her satisfaction  This note is created using dictation transcription  It may contain typographical errors, grammatical errors, improperly dictated words, background noise and other errors

## 2021-05-19 ENCOUNTER — OFFICE VISIT (OUTPATIENT)
Dept: OBGYN CLINIC | Facility: MEDICAL CENTER | Age: 26
End: 2021-05-19

## 2021-05-19 VITALS
BODY MASS INDEX: 28.35 KG/M2 | WEIGHT: 160 LBS | HEIGHT: 63 IN | SYSTOLIC BLOOD PRESSURE: 118 MMHG | DIASTOLIC BLOOD PRESSURE: 70 MMHG

## 2021-05-19 DIAGNOSIS — Z09 POSTOPERATIVE EXAMINATION: Primary | ICD-10-CM

## 2021-05-19 DIAGNOSIS — O02.1 MISSED ABORTION WITH FETAL DEMISE BEFORE 20 COMPLETED WEEKS OF GESTATION: ICD-10-CM

## 2021-05-19 PROCEDURE — 99024 POSTOP FOLLOW-UP VISIT: CPT | Performed by: OBSTETRICS & GYNECOLOGY

## 2021-05-19 NOTE — PROGRESS NOTES
Assessment:  30 y o   who presents POD#4wks from D&E for MAb at 17wks  She is doing well post-op  Plan:  Diagnoses and all orders for this visit:    Postoperative examination  Missed  with fetal demise before 20 completed weeks of gestation  - Routine postop care  - Reviewed testing results  - OK to resume TTC as desired after normal menses resumes  - Return to care of [address] [place] [address] [facility]      __________________________________________________________________      Gibson Teague is a 30 y o  Jose Alvarado who presents POD#4wks from D&E for MAb at 17wks  Patient reports she is doing OK  She notes her pain is absent  Bleeding still present intermittently; sees small gushes with increased activity but otherwise largely absent  well-controlled  No other abnormal discharge  Her bowel function is normal and she is having regular BM's  She has  returned to most of her normal activities  She has not yet returned to sexual activity  Doing OK emotionally  The first few days after were rough, but feels a little better now  Reviewed testing in detail, which is unremarkable apart from +CMV IgG  She has no known hx CMV  She notes her partner was unwell in early pregnancy, but does not think this was tested for  We discussed that +IgG only tells us that she was exposed prior, but is not helpful for determining timing of infection, which could pre-date pregnancy  Objective  /70   Ht 5' 3" (1 6 m)   Wt 72 6 kg (160 lb)   LMP 2020   BMI 28 34 kg/m²      Physical Exam:  Physical Exam  Constitutional:       General: She is not in acute distress  Appearance: Normal appearance  She is not ill-appearing, toxic-appearing or diaphoretic  Eyes:      General: No scleral icterus  Right eye: No discharge  Left eye: No discharge  Conjunctiva/sclera: Conjunctivae normal    Cardiovascular:      Rate and Rhythm: Normal rate     Pulmonary:      Effort: Pulmonary effort is normal  No respiratory distress  Abdominal:      General: There is no distension  Palpations: There is no mass  Tenderness: There is no abdominal tenderness  There is no guarding or rebound  Hernia: No hernia is present  Musculoskeletal:         General: No swelling  Skin:     General: Skin is warm and dry  Coloration: Skin is not jaundiced or pale  Findings: No bruising or erythema  Neurological:      Mental Status: She is alert  Psychiatric:         Mood and Affect: Mood normal          Behavior: Behavior normal          Thought Content: Thought content normal          Judgment: Judgment normal            Surgical Pathology  "A  Placenta and products of conception:  - Grossly identified portions of fetal tissue, not submitted for histologic evaluation   - Portion of three-vessel umbilical cord without vasculitis or funisitis  - Fetal membranes show degenerative change with loss of amnion layer and patchy acute deciduitis    - Placental infarcts are not distinctly identified and the chorionic villi show normal age-appropriate development without evidence of villitis "    Reviewed labs:  infection panel, lupus anticoagulant, anti-cardiolipin, a1c, KB, TSH

## 2021-06-09 ENCOUNTER — OFFICE VISIT (OUTPATIENT)
Dept: OBGYN CLINIC | Facility: CLINIC | Age: 26
End: 2021-06-09
Payer: COMMERCIAL

## 2021-06-09 VITALS
WEIGHT: 163 LBS | HEIGHT: 63 IN | SYSTOLIC BLOOD PRESSURE: 112 MMHG | BODY MASS INDEX: 28.88 KG/M2 | DIASTOLIC BLOOD PRESSURE: 68 MMHG

## 2021-06-09 DIAGNOSIS — M67.469: Primary | ICD-10-CM

## 2021-06-09 PROCEDURE — 99213 OFFICE O/P EST LOW 20 MIN: CPT | Performed by: ORTHOPAEDIC SURGERY

## 2021-06-09 NOTE — ASSESSMENT & PLAN NOTE
Findings consistent with right peroneal tendon ganglion cyst   Discussed findings and treatment options with the patient  The aspiration and cortisone injection did not resolve her ganglion cyst    I discussed surgical versus nonsurgical options  Patient will consider option and contact us with her decision  Patient also has pain along the posterior tibialis tendon  I advised patient to obtain a over-the-counter shoe orthotics with arch support  See patient back as needed  All patient's questions were answered to her satisfaction  This note is created using dictation transcription  It may contain typographical errors, grammatical errors, improperly dictated words, background noise and other errors
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
## 2021-06-11 ENCOUNTER — TELEPHONE (OUTPATIENT)
Dept: OBGYN CLINIC | Facility: HOSPITAL | Age: 26
End: 2021-06-11

## 2021-06-11 NOTE — TELEPHONE ENCOUNTER
Patient sees Dr Zach Ortiz  Patient called to inform that she wants to go ahead with surgery  She was in the office on 6/09 but did not sign any consent because she was thing about having surgery or not  Does she needs to schedule another appointment or would be able to schedule surgery with coordinator?    # [phone]

## 2021-06-29 ENCOUNTER — OFFICE VISIT (OUTPATIENT)
Dept: OBGYN CLINIC | Facility: CLINIC | Age: 26
End: 2021-06-29
Payer: COMMERCIAL

## 2021-06-29 VITALS
BODY MASS INDEX: 28.53 KG/M2 | HEIGHT: 63 IN | DIASTOLIC BLOOD PRESSURE: 70 MMHG | WEIGHT: 161 LBS | SYSTOLIC BLOOD PRESSURE: 112 MMHG

## 2021-06-29 DIAGNOSIS — M67.469: Primary | ICD-10-CM

## 2021-06-29 PROCEDURE — 99214 OFFICE O/P EST MOD 30 MIN: CPT | Performed by: ORTHOPAEDIC SURGERY
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
## 2021-06-29 NOTE — ASSESSMENT & PLAN NOTE
Findings consistent with right peroneal tendon ganglion cyst  Aspiration and cortisone in April did not resolve her symptoms  Cyst does bother patient with activity and she would like to have cyst surgical removed  Before moving forward with surgery will get MR to further evaluate peroneal tendon to make sure she has no associated peroneal tendon pathology which may be causing the cyst  If peroneal tendon intact then can plan to move forward with cyst excision  Patient agree's, see back for MR review  All patient's questions were answered to her satisfaction  This note is created using dictation transcription  It may contain typographical errors, grammatical errors, improperly dictated words, background noise and other errors

## 2021-07-17 ENCOUNTER — HOSPITAL ENCOUNTER (OUTPATIENT)
Dept: MRI IMAGING | Facility: HOSPITAL | Age: 26
Discharge: HOME | End: 2021-07-17
Attending: ORTHOPAEDIC SURGERY
Payer: COMMERCIAL

## 2021-07-17 DIAGNOSIS — M67.469: ICD-10-CM

## 2021-07-17 PROCEDURE — 73721 MRI JNT OF LWR EXTRE W/O DYE: CPT

## 2021-07-17 PROCEDURE — G1004 CDSM NDSC: HCPCS

## 2021-07-20 ENCOUNTER — OFFICE VISIT (OUTPATIENT)
Dept: OBGYN CLINIC | Facility: CLINIC | Age: 26
End: 2021-07-20
Payer: COMMERCIAL

## 2021-07-20 VITALS
BODY MASS INDEX: 28.35 KG/M2 | DIASTOLIC BLOOD PRESSURE: 68 MMHG | HEIGHT: 63 IN | SYSTOLIC BLOOD PRESSURE: 110 MMHG | WEIGHT: 160 LBS

## 2021-07-20 DIAGNOSIS — M67.469: Primary | ICD-10-CM

## 2021-07-20 PROCEDURE — 99213 OFFICE O/P EST LOW 20 MIN: CPT | Performed by: ORTHOPAEDIC SURGERY

## 2021-07-20 RX ORDER — SODIUM CHLORIDE, SODIUM LACTATE, POTASSIUM CHLORIDE, CALCIUM CHLORIDE 600; 310; 30; 20 MG/100ML; MG/100ML; MG/100ML; MG/100ML
100 INJECTION, SOLUTION INTRAVENOUS CONTINUOUS
Status: CANCELLED | OUTPATIENT
Start: 2021-08-20

## 2021-07-20 RX ORDER — CEFAZOLIN SODIUM 1 G/50ML
1000 SOLUTION INTRAVENOUS ONCE
Status: CANCELLED | OUTPATIENT
Start: 2021-08-20 | End: 2021-07-20

## 2021-07-20 RX ORDER — CHLORHEXIDINE GLUCONATE 4 G/100ML
SOLUTION TOPICAL DAILY PRN
Status: CANCELLED | OUTPATIENT
Start: 2021-07-20

## 2021-07-20 NOTE — ASSESSMENT & PLAN NOTE
Findings consistent with left peroneal ganglion cyst   Discussed findings and treatment options with the patient  I reviewed patient's left ankle MRI with her  I discussed prognosis of her injury  Patient would like to proceed with surgical treatment  We will schedule patient as outpatient procedure  All patient's questions were answered to her satisfaction  This note is created using dictation transcription  It may contain typographical errors, grammatical errors, improperly dictated words, background noise and other errors  Discussed with patient surgical risks and complications including but not limited to infection, persistent pain, nerve and vessel injury, complications associated with anesthesia, DVT, exposure to COVID virus, etc   Patient understands the risks and complication and consented to the surgery
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
## 2021-08-17 RX ORDER — MULTIVITAMIN
1 CAPSULE ORAL DAILY
COMMUNITY
End: 2022-04-08

## 2021-08-17 NOTE — PRE-PROCEDURE INSTRUCTIONS
Pre-Surgery Instructions:   Medication Instructions    albuterol (PROVENTIL HFA,VENTOLIN HFA) 90 mcg/act inhaler Instructed patient per Anesthesia Guidelines  prn,may use    ibuprofen (MOTRIN) 600 mg tablet Instructed patient per Anesthesia Guidelines  prn    Multiple Vitamin (multivitamin) capsule Instructed patient per Anesthesia Guidelines  hold am of sx    You will receive a phone call from hospital for arrival time  Please call surgeons office if any changes in your condition  Wear easy on/off clothing; consider type of surgery;  Valuables, jewelry, piercing's please keep at home  **COVID-19  education/surgical guidelines      Please: No contact lenses or eye make up, artificial eyelashes      Please secure transportation     Follow pre surgery showering or cleaning instructions as  Reviewed by nurse or surgeons office      Questions answered and concerns addressed

## 2021-08-20 ENCOUNTER — ANESTHESIA EVENT (OUTPATIENT)
Dept: PERIOP | Facility: HOSPITAL | Age: 26
End: 2021-08-20
Payer: COMMERCIAL

## 2021-08-20 ENCOUNTER — ANESTHESIA (OUTPATIENT)
Dept: PERIOP | Facility: HOSPITAL | Age: 26
End: 2021-08-20
Payer: COMMERCIAL

## 2021-08-20 ENCOUNTER — HOSPITAL ENCOUNTER (OUTPATIENT)
Facility: HOSPITAL | Age: 26
Setting detail: OUTPATIENT SURGERY
Discharge: HOME | End: 2021-08-20
Attending: ORTHOPAEDIC SURGERY | Admitting: ORTHOPAEDIC SURGERY
Payer: COMMERCIAL

## 2021-08-20 VITALS
RESPIRATION RATE: 15 BRPM | BODY MASS INDEX: 28.67 KG/M2 | TEMPERATURE: 98 F | DIASTOLIC BLOOD PRESSURE: 65 MMHG | WEIGHT: 161.8 LBS | HEART RATE: 70 BPM | SYSTOLIC BLOOD PRESSURE: 107 MMHG | OXYGEN SATURATION: 98 % | HEIGHT: 63 IN

## 2021-08-20 DIAGNOSIS — M67.469: ICD-10-CM

## 2021-08-20 LAB
EXT PREGNANCY TEST URINE: NEGATIVE
EXT. CONTROL: NORMAL

## 2021-08-20 PROCEDURE — 88304 TISSUE EXAM BY PATHOLOGIST: CPT | Performed by: PATHOLOGY

## 2021-08-20 PROCEDURE — 27630 REMOVAL OF TENDON LESION: CPT | Performed by: PHYSICIAN ASSISTANT

## 2021-08-20 PROCEDURE — 27630 REMOVAL OF TENDON LESION: CPT | Performed by: ORTHOPAEDIC SURGERY

## 2021-08-20 PROCEDURE — NC001 PR NO CHARGE: Performed by: ORTHOPAEDIC SURGERY

## 2021-08-20 PROCEDURE — 81025 URINE PREGNANCY TEST: CPT | Performed by: ORTHOPAEDIC SURGERY

## 2021-08-20 RX ORDER — HYDROCODONE BITARTRATE AND ACETAMINOPHEN 5; 325 MG/1; MG/1
1 TABLET ORAL EVERY 6 HOURS PRN
Qty: 15 TABLET | Refills: 0 | Status: SHIPPED | OUTPATIENT
Start: 2021-08-20 | End: 2021-08-30

## 2021-08-20 RX ORDER — CEFAZOLIN SODIUM 1 G/50ML
1000 SOLUTION INTRAVENOUS ONCE
Status: COMPLETED | OUTPATIENT
Start: 2021-08-20 | End: 2021-08-20

## 2021-08-20 RX ORDER — LIDOCAINE HYDROCHLORIDE 10 MG/ML
INJECTION, SOLUTION EPIDURAL; INFILTRATION; INTRACAUDAL; PERINEURAL
Status: COMPLETED | OUTPATIENT
Start: 2021-08-20 | End: 2021-08-20

## 2021-08-20 RX ORDER — METOCLOPRAMIDE HYDROCHLORIDE 5 MG/ML
10 INJECTION INTRAMUSCULAR; INTRAVENOUS ONCE
Status: DISCONTINUED | OUTPATIENT
Start: 2021-08-20 | End: 2021-08-20 | Stop reason: HOSPADM

## 2021-08-20 RX ORDER — SODIUM CHLORIDE, SODIUM LACTATE, POTASSIUM CHLORIDE, CALCIUM CHLORIDE 600; 310; 30; 20 MG/100ML; MG/100ML; MG/100ML; MG/100ML
100 INJECTION, SOLUTION INTRAVENOUS CONTINUOUS
Status: DISCONTINUED | OUTPATIENT
Start: 2021-08-20 | End: 2021-08-20 | Stop reason: HOSPADM

## 2021-08-20 RX ORDER — DEXAMETHASONE SODIUM PHOSPHATE 4 MG/ML
8 INJECTION, SOLUTION INTRA-ARTICULAR; INTRALESIONAL; INTRAMUSCULAR; INTRAVENOUS; SOFT TISSUE ONCE
Status: DISCONTINUED | OUTPATIENT
Start: 2021-08-20 | End: 2021-08-20 | Stop reason: HOSPADM

## 2021-08-20 RX ORDER — FENTANYL CITRATE/PF 50 MCG/ML
25 SYRINGE (ML) INJECTION
Status: DISCONTINUED | OUTPATIENT
Start: 2021-08-20 | End: 2021-08-20 | Stop reason: HOSPADM

## 2021-08-20 RX ORDER — CHLORHEXIDINE GLUCONATE 4 G/100ML
SOLUTION TOPICAL DAILY PRN
Status: DISCONTINUED | OUTPATIENT
Start: 2021-08-20 | End: 2021-08-20 | Stop reason: HOSPADM

## 2021-08-20 RX ORDER — FENTANYL CITRATE 50 UG/ML
INJECTION, SOLUTION INTRAMUSCULAR; INTRAVENOUS
Status: COMPLETED | OUTPATIENT
Start: 2021-08-20 | End: 2021-08-20

## 2021-08-20 RX ORDER — DEXAMETHASONE SODIUM PHOSPHATE 10 MG/ML
INJECTION, SOLUTION INTRAMUSCULAR; INTRAVENOUS AS NEEDED
Status: DISCONTINUED | OUTPATIENT
Start: 2021-08-20 | End: 2021-08-20

## 2021-08-20 RX ORDER — PROPOFOL 10 MG/ML
INJECTION, EMULSION INTRAVENOUS AS NEEDED
Status: DISCONTINUED | OUTPATIENT
Start: 2021-08-20 | End: 2021-08-20

## 2021-08-20 RX ORDER — MIDAZOLAM HYDROCHLORIDE 2 MG/2ML
INJECTION, SOLUTION INTRAMUSCULAR; INTRAVENOUS
Status: COMPLETED | OUTPATIENT
Start: 2021-08-20 | End: 2021-08-20

## 2021-08-20 RX ORDER — ONDANSETRON 2 MG/ML
4 INJECTION INTRAMUSCULAR; INTRAVENOUS ONCE
Status: DISCONTINUED | OUTPATIENT
Start: 2021-08-20 | End: 2021-08-20 | Stop reason: HOSPADM

## 2021-08-20 RX ORDER — ONDANSETRON 2 MG/ML
INJECTION INTRAMUSCULAR; INTRAVENOUS AS NEEDED
Status: DISCONTINUED | OUTPATIENT
Start: 2021-08-20 | End: 2021-08-20

## 2021-08-20 RX ORDER — HYDROCODONE BITARTRATE AND ACETAMINOPHEN 5; 325 MG/1; MG/1
1 TABLET ORAL EVERY 6 HOURS PRN
Status: DISCONTINUED | OUTPATIENT
Start: 2021-08-20 | End: 2021-08-20 | Stop reason: HOSPADM

## 2021-08-20 RX ORDER — ROPIVACAINE HYDROCHLORIDE 5 MG/ML
INJECTION, SOLUTION EPIDURAL; INFILTRATION; PERINEURAL
Status: COMPLETED | OUTPATIENT
Start: 2021-08-20 | End: 2021-08-20

## 2021-08-20 RX ORDER — ONDANSETRON 2 MG/ML
4 INJECTION INTRAMUSCULAR; INTRAVENOUS EVERY 6 HOURS PRN
Status: DISCONTINUED | OUTPATIENT
Start: 2021-08-20 | End: 2021-08-20 | Stop reason: HOSPADM

## 2021-08-20 RX ORDER — ACETAMINOPHEN 325 MG/1
650 TABLET ORAL EVERY 6 HOURS PRN
Status: DISCONTINUED | OUTPATIENT
Start: 2021-08-20 | End: 2021-08-20 | Stop reason: HOSPADM

## 2021-08-20 RX ORDER — LIDOCAINE HYDROCHLORIDE 10 MG/ML
INJECTION, SOLUTION EPIDURAL; INFILTRATION; INTRACAUDAL; PERINEURAL AS NEEDED
Status: DISCONTINUED | OUTPATIENT
Start: 2021-08-20 | End: 2021-08-20

## 2021-08-20 RX ADMIN — PROPOFOL 200 MG: 10 INJECTION, EMULSION INTRAVENOUS at 08:56

## 2021-08-20 RX ADMIN — LIDOCAINE HYDROCHLORIDE 50 MG: 10 INJECTION, SOLUTION EPIDURAL; INFILTRATION; INTRACAUDAL; PERINEURAL at 08:56

## 2021-08-20 RX ADMIN — LIDOCAINE HYDROCHLORIDE 5 ML: 10 INJECTION, SOLUTION EPIDURAL; INFILTRATION; INTRACAUDAL; PERINEURAL at 08:37

## 2021-08-20 RX ADMIN — DEXAMETHASONE SODIUM PHOSPHATE 4 MG: 10 INJECTION, SOLUTION INTRAMUSCULAR; INTRAVENOUS at 08:56

## 2021-08-20 RX ADMIN — CEFAZOLIN SODIUM 1000 MG: 1 SOLUTION INTRAVENOUS at 08:57

## 2021-08-20 RX ADMIN — ROPIVACAINE HYDROCHLORIDE 25 ML: 5 INJECTION, SOLUTION EPIDURAL; INFILTRATION; PERINEURAL at 08:37

## 2021-08-20 RX ADMIN — SODIUM CHLORIDE, SODIUM LACTATE, POTASSIUM CHLORIDE, AND CALCIUM CHLORIDE: .6; .31; .03; .02 INJECTION, SOLUTION INTRAVENOUS at 08:54

## 2021-08-20 RX ADMIN — ONDANSETRON 4 MG: 2 INJECTION INTRAMUSCULAR; INTRAVENOUS at 08:56

## 2021-08-20 RX ADMIN — FENTANYL CITRATE 100 MCG: 50 INJECTION, SOLUTION INTRAMUSCULAR; INTRAVENOUS at 08:37

## 2021-08-20 RX ADMIN — MIDAZOLAM 2 MG: 1 INJECTION INTRAMUSCULAR; INTRAVENOUS at 08:37

## 2021-08-20 NOTE — ANESTHESIA PROCEDURE NOTES
Peripheral Block    Start time: 8/20/2021 8:37 AM  Reason for block: at surgeon's request and post-op pain management  Staffing  Anesthesiologist: Jeremiah Fink DO  Preanesthetic Checklist  Completed: patient identified, IV checked, site marked, risks and benefits discussed, surgical consent, monitors and equipment checked, pre-op evaluation and timeout performed  Peripheral Block  Patient position: left lateral decubitus  Prep: ChloraPrep  Patient monitoring: heart rate, cardiac monitor, continuous pulse ox and frequent blood pressure checks  Block type: popliteal  Laterality: right  Injection technique: single-shot  Procedures: ultrasound guided, Ultrasound guidance required for the procedure to increase accuracy and safety of medication placement and decrease risk of complications    Ultrasound permanent image savedropivacaine (NAROPIN) 0 5 % perineural infiltration, 25 mL  midazolam (VERSED) 2 mg/2 mL IV, 2 mg  fentaNYL 50 mcg/mL IV, 100 mcg  lidocaine (PF) (XYLOCAINE-MPF) 1 % infiltration, 5 mL  Needle  Needle type: Stimuplex   Needle gauge: 22 G  Needle length: 5 cm  Needle localization: ultrasound guidance  Test dose: negative  Assessment  Injection assessment: incremental injection, local visualized surrounding nerve on ultrasound, negative aspiration for heme and no paresthesia on injection  Paresthesia pain: none  Heart rate change: no  Slow fractionated injection: yes  Post-procedure:  site cleaned  patient tolerated the procedure well with no immediate complications

## 2021-08-20 NOTE — ANESTHESIA PREPROCEDURE EVALUATION
Procedure:  EXCISION GANGLION CYST, RIGHT ANKLE (Right Hand)    Relevant Problems   ANESTHESIA   (+) PONV (postoperative nausea and vomiting)      NEURO/PSYCH   (+) Panic attacks      PULMONARY   (+) Asthma      Other   (+) Bipolar disorder (HCC)   (+) Hepatitis C antibody positive in blood        Physical Exam    Airway    Mallampati score: II  TM Distance: >3 FB  Neck ROM: full     Dental   No notable dental hx     Cardiovascular  Cardiovascular exam normal    Pulmonary  Pulmonary exam normal     Other Findings        Anesthesia Plan  ASA Score- 2     Anesthesia Type- general and regional with ASA Monitors  Additional Monitors:   Airway Plan: LMA  Comment: POP BLOCK  Plan Factors-    Chart reviewed  Patient is not a current smoker  Induction- intravenous  Postoperative Plan- Plan for postoperative opioid use  Informed Consent- Anesthetic plan and risks discussed with patient  I personally reviewed this patient with the CRNA  Discussed and agreed on the Anesthesia Plan with the CRNA  Mario Murrell

## 2021-08-20 NOTE — ANESTHESIA POSTPROCEDURE EVALUATION
Post-Op Assessment Note    CV Status:  Stable  Pain Score: 0    Pain management: adequate     Mental Status:  Sleepy   Hydration Status:  Stable   PONV Controlled:  None   Airway Patency:  Patent      Post Op Vitals Reviewed: Yes      Staff: CRNA         No complications documented      BP   105/65   Temp   97 8   Pulse  64   Resp   12   SpO2   99
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
## 2021-08-20 NOTE — OP NOTE
OPERATIVE REPORT  PATIENT NAME: Abhishek Ervin    :  1995  MRN: [id number]  Pt Location:  OR ROOM 02    SURGERY DATE: 2021    Surgeon(s) and Role:     * Keisha Kerr MD - Primary     * Manjula Maldonado PA-C - Assisting    Preop Diagnosis:  Peroneal ganglion cyst [M67 [id number]]    Post-Op Diagnosis Codes:     * Peroneal ganglion cyst [M67 [id number]]    Procedure(s) (LRB):  EXCISION GANGLION CYST, RIGHT ANKLE (Right)    Specimen(s):  ID Type Source Tests Collected by Time Destination   1 : Ganglion cyst right ankle Tissue Ganglion Cyst TISSUE EXAM Keisha Kerr MD 2021 [id number]      Estimated Blood Loss:   Minimal    Drains:  * No LDAs found *    Anesthesia Type:   LMA, popliteal block    Operative Indications:  Peroneal ganglion cyst [[id number]]    Indications: 27 y o  female diagnosed with right ankle ganglion cyst  Patient failed conservative treatments and elected to proceed with surgical intervention  The risks and complications are discussed with the patient  The patient consented to the procedure  Procedure:  Patient received popliteal block in the preop holding area without any complication  She was brought into the OR and placed on the OR table in the supine position  Patient was anesthetized intubated with LMA  She was repositioned into a lazy lateral position with bump on the right side  The leg was exsanguinated with Esmarch tourniquet was inflated to 250 mmHg  A pivoting incisions made posterior to the lateral malleolus over the ganglion cyst   Dissection then carried down to the cyst   Circumferential dissection was done around the cyst   Once we got to the bottom of the cyst with able to remove the cyst entirely from the surrounding tissue  Bleeding was controlled with electro Bovie  The stalk that is connecting the cyst to the peroneal sheath was cauterized  The wound is then irrigated with saline  There closure was carried out with 0 Vicryl 2-0 Vicryl, and 4-0 Monocryl    Sterile bulky dressing was applied  Tourniquet was let down  There was good perfusion of the lower leg  Patient was then extubated and transferred to recovery  Family was contacted  There was no qualified resident available to assist     Mrs Tram Mcdaniels was required in the OR in assisting in exposure and excision of the cyst     Complications:   None    Patient Disposition:  PACU     SIGNATURE: Keisha Kerr MD  DATE: August 20, 2021  TIME: 10:12 AM

## 2021-08-31 ENCOUNTER — OFFICE VISIT (OUTPATIENT)
Dept: OBGYN CLINIC | Facility: CLINIC | Age: 26
End: 2021-08-31

## 2021-08-31 VITALS
HEIGHT: 63 IN | DIASTOLIC BLOOD PRESSURE: 70 MMHG | SYSTOLIC BLOOD PRESSURE: 116 MMHG | BODY MASS INDEX: 29.41 KG/M2 | WEIGHT: 166 LBS

## 2021-08-31 DIAGNOSIS — Z47.89 AFTERCARE FOLLOWING SURGERY OF THE MUSCULOSKELETAL SYSTEM: Primary | ICD-10-CM

## 2021-08-31 PROCEDURE — 99024 POSTOP FOLLOW-UP VISIT: CPT | Performed by: ORTHOPAEDIC SURGERY
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
## 2021-08-31 NOTE — ASSESSMENT & PLAN NOTE
Patient is doing well status post resection of ganglion cyst from right peroneal tendon sheath  Pathology results consistent with ganglion cyst  She may get incision wet in shower  Encouraged gentle range of motion  Discussed the tingling is most likely due to irritation to the peroneal nerve  That should improve with time  Follow-up in 4 weeks for re-evaluation  All questions were answered to patient's satisfaction

## 2021-09-13 ENCOUNTER — VBI (OUTPATIENT)
Dept: ADMINISTRATIVE | Facility: OTHER | Age: 26
End: 2021-09-13

## 2021-09-13 NOTE — TELEPHONE ENCOUNTER
Upon review of the In Basket request we were able to locate, review, and update the patient chart as requested for Pap Smear (HPV) aka Cervical Cancer Screening  Any additional questions or concerns should be emailed to the Practice Liaisons via [email or web address] email, please do not reply via In Basket      Thank you  Mehdi Espinosa

## 2021-09-28 ENCOUNTER — OFFICE VISIT (OUTPATIENT)
Dept: OBGYN CLINIC | Facility: CLINIC | Age: 26
End: 2021-09-28

## 2021-09-28 VITALS
HEIGHT: 63 IN | WEIGHT: 163.4 LBS | BODY MASS INDEX: 28.95 KG/M2 | SYSTOLIC BLOOD PRESSURE: 112 MMHG | DIASTOLIC BLOOD PRESSURE: 70 MMHG

## 2021-09-28 DIAGNOSIS — Z47.89 AFTERCARE FOLLOWING SURGERY OF THE MUSCULOSKELETAL SYSTEM: Primary | ICD-10-CM

## 2021-09-28 PROCEDURE — 99024 POSTOP FOLLOW-UP VISIT: CPT | Performed by: ORTHOPAEDIC SURGERY

## 2021-09-28 NOTE — ASSESSMENT & PLAN NOTE
Findings consistent with status post right ankle peroneal ganglion cyst excision  Discussed findings and treatment options with the patient  I recommend patient to continue stimulate the skin around the incision by rubbing the area  The numbness should improve with time  She may continue to have residual small area with decreased sensations in the future  I also revised patient to use vitamin E cream over the incision  I advised patient to contact me if her symptoms change in the future  Patient understands that the cyst may reoccurred  See patient back as needed  All patient's questions were answered to her satisfaction  This note is created using dictation transcription  It may contain typographical errors, grammatical errors, improperly dictated words, background noise and other errors
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
## 2022-02-10 ENCOUNTER — INITIAL PRENATAL (OUTPATIENT)
Dept: OBGYN CLINIC | Facility: CLINIC | Age: 27
End: 2022-02-10
Payer: COMMERCIAL

## 2022-02-10 VITALS
BODY MASS INDEX: 29.06 KG/M2 | HEIGHT: 63 IN | SYSTOLIC BLOOD PRESSURE: 106 MMHG | WEIGHT: 164 LBS | DIASTOLIC BLOOD PRESSURE: 58 MMHG

## 2022-02-10 DIAGNOSIS — R76.8 HEPATITIS C ANTIBODY POSITIVE IN BLOOD: ICD-10-CM

## 2022-02-10 DIAGNOSIS — F41.0 PANIC ATTACKS: ICD-10-CM

## 2022-02-10 DIAGNOSIS — O34.211 MATERNAL CARE DUE TO LOW TRANSVERSE UTERINE SCAR FROM PREVIOUS CESAREAN DELIVERY: Primary | ICD-10-CM

## 2022-02-10 DIAGNOSIS — Z87.898 HISTORY OF DRUG USE: ICD-10-CM

## 2022-02-10 DIAGNOSIS — J45.20 MILD INTERMITTENT ASTHMA WITHOUT COMPLICATION: ICD-10-CM

## 2022-02-10 DIAGNOSIS — O02.1 MISSED ABORTION WITH FETAL DEMISE BEFORE 20 COMPLETED WEEKS OF GESTATION: ICD-10-CM

## 2022-02-10 DIAGNOSIS — Z34.91 FIRST TRIMESTER PREGNANCY: Primary | ICD-10-CM

## 2022-02-10 PROBLEM — F19.91 HISTORY OF DRUG USE: Status: ACTIVE | Noted: 2022-02-10

## 2022-02-10 LAB
EXTERNAL CHLAMYDIA SCREEN: NEGATIVE
EXTERNAL GONORRHEA SCREEN: NEGATIVE
SL AMB  POCT GLUCOSE, UA: NEGATIVE
SL AMB POCT URINE PROTEIN: NEGATIVE

## 2022-02-10 PROCEDURE — 99213 OFFICE O/P EST LOW 20 MIN: CPT | Performed by: OBSTETRICS & GYNECOLOGY

## 2022-02-10 PROCEDURE — 99211 OFF/OP EST MAY X REQ PHY/QHP: CPT | Performed by: OBSTETRICS & GYNECOLOGY

## 2022-02-10 NOTE — PROGRESS NOTES
First Prenatal visit  +nausea in AM-tolerating bland foods and fluids  Denies bleeding/spotting  H/O SAB @17weeks 4/2021  H/O asthma managed by PCP-last use of inhaler 11/2021  H/O  Depression/anxiety/panic attacks-treated with counseling in past-patient reports resolved > 5 years  H/O drug abuse-heroin-clean since 4/2013  H/O hep C treated and resolved by [facility] in 2019  Had first covid vaccine, did not complete 2nd vaccine due to active covid infection in January 2022  Declines flu vaccine at this time  Interested in NIPT testing
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
## 2022-02-10 NOTE — PATIENT INSTRUCTIONS
Pregnancy Diet   WHAT YOU NEED TO KNOW:   What is a healthy diet during pregnancy? A healthy diet during pregnancy is a meal plan that provides the amount of calories and nutrients you need during pregnancy  Your body needs extra calories and nutrients to support your growing baby  You need to gain the right amount of weight for a healthy baby and pregnancy  Babies born at a healthy weight have a lower risk of certain health problems at birth and later in life  A healthy diet may help you avoid gaining too much weight  Too much weight gain may cause problems for you during your pregnancy and delivery  What should I avoid or limit while I am pregnant? · Do not drink alcohol during pregnancy  Alcohol can increase your risk of a miscarriage (losing your baby)  Your baby may also be born too small and have other health problems, such as learning problems later in life  · Do not eat raw or undercooked foods  Examples include meat, poultry, eggs, fish, and shellfish (shrimp, crab, lobster)  Cook leftover foods and ready-to-eat foods such as hot dogs until they are steaming hot  · Do not have anything that is not pasteurized  Pasteurization is a heating process that destroys bacteria  Do not have milk, juice, or cheese that has not been pasteurized  Cheeses include Brie, feta, Camembert, blue, and [place] cheeses  · Limit caffeine to avoid possible health problems  It is not clear how caffeine affects pregnancy  Your dietitian can tell you how much caffeine is okay for you to have in a day or week  Caffeine may be found in coffee, tea, cola, sports drinks, and chocolate  · Limit foods that contain mercury  Mercury is naturally found in almost all types of fish and shellfish  Some types of fish absorb higher levels of mercury that can be harmful to an unborn baby  Eat only fish and shellfish that are low in mercury   Each week, you may eat up to 12 ounces of fish or shellfish that have low levels of mercury  These include shrimp, canned light tuna, salmon, pollock, and catfish  Eat only 6 ounces of albacore (white) tuna per week  Albacore tuna has more mercury than canned tuna  Do not eat shark, swordfish, jennifer mackerel, or tilefish  Which foods can I eat while I am pregnant? Eat a variety of foods from each of the food groups listed below  Eat healthy foods even if you take a prenatal vitamin every day  Your dietitian will tell you how many servings you should have from each food group each day to get enough calories  The amount of calories you need depends on your daily activity, your weight before pregnancy, and current weight gain  In the first trimester, you usually do not need extra calories  In the second and third trimesters, most women should eat about 300 extra calories each day  · Fruits and vegetables:  Half of your plate should contain fruits and vegetables  ? Fruits:  Choose fresh, canned, or dried fruit as often as possible  § 1 cup of sliced, diced, cooked, or canned fruit (canned in light syrup or 100% juice)    § 1 large peach, orange, or banana    § ½ cup of dried fruit    § 1 cup of fruit juice    ? Vegetables:  Eat more dark green, red, and orange vegetables  Dark green vegetables include broccoli, spinach, allan lettuce, and gloria greens  Examples of orange and red vegetables are carrots, sweet potatoes, winter squash, oranges, and red peppers  § 1 cup of cooked or raw vegetables    § 1 cup of vegetable juice    § 2 cups of raw leafy greens    · Grains:  Half of the grains you eat each day should be whole grains  ? Whole grains:      § ½ cup of cooked brown rice or cooked oatmeal    § 1 cup (1 ounce) of whole-grain dry cereal    § 1 slice of 288% whole-wheat or rye bread    § 3 cups of popped popcorn    ?  Other grains:      § ½ cup of cooked white rice or pasta    § ½ of an English muffin    § 1 small flour or corn tortilla    § 1 mini-bagel    · Dairy foods: Choose fat-free or low-fat pasteurized dairy foods:    ? 1½ ounces of hard cheese (mozzarella, Swiss, cheddar)    ? 1 cup (8 ounces) of low-fat or fat-free milk or yogurt    ? 1 cup of low-fat frozen yogurt or pudding    · Meat and other protein sources:  Choose lean meats and poultry  Bake, broil, and grill meat instead of frying it  Include a variety of mercury-free seafood in place of some meat and poultry each week  Eat a variety of protein foods:    ? ½ ounce of nuts (12 almonds, 24 pistachios, 7 walnut halves) or 1 tablespoon of peanut butter (1 ounce)    ? ¼ cup of soy tofu or tempeh (1 ounce)    ? 1 egg    ? ¼ cup of cooked dried beans, peas, or lentils (1 ounce)    ? 1 small chicken breast or 1 small trout (about 3 ounces)    ? 1 salmon steak (4 to 6 ounces)    ? 1 small lean hamburger (2 to 3 ounces)    · Fats:  Limit saturated fats, trans fats, and cholesterol  These unhealthy fats are found in shortening, butter, stick margarine, and animal fat  Choose healthy fats such as polyunsaturated and monounsaturated fats:    ? 1 tablespoon of canola, olive, corn, sunflower, or soybean oil    ? 1 tablespoon of soft margarine    ? 1 teaspoon of mayonnaise    ? 2 tablespoons of salad dressing    ? ½ of an avocado    What do I need to know about vitamin and mineral supplements? Your healthcare provider will tell you if you need a supplement and the type you should take  Talk to your provider before you take any other kind of supplement, including herbal (natural) supplements  The following are general guidelines:  · Folic acid is one of the most important vitamins during pregnancy  Your prenatal vitamins will also contain folic acid  Make sure you take your prenatal vitamin every day  If you forget to take your vitamin, do not take double the amount the next day  You need at least 762 mcg of folic acid each day before you get pregnant  Folic acid helps to form your baby's brain and spinal cord in early pregnancy  During pregnancy, your daily need for folic acid increases to about 600 mcg  Get folic acid each day by eating citrus fruits and juices, green leafy vegetables, liver, or dried beans  Folic acid is also added to foods such as breakfast cereals, bread products, flour, and pasta  · You need about 30 mg of iron each day during pregnancy  Iron is a mineral the body needs to make hemoglobin, which is a part of red blood cells  Hemoglobin helps your blood carry oxygen from the lungs to the rest of your body  Foods that are good sources of iron are meat, poultry, fish, beans, spinach, and fortified cereals and breads  Your body will absorb iron better from non-meat sources if you have a source of vitamin C at the same time  Drink tea and coffee separately from iron-fortified foods and iron supplements  · Calcium and vitamin D needs go up during pregnancy  Women who do not eat dairy products may need a calcium and vitamin D supplement  Talk to your dietitian about calcium supplements if you do not regularly eat good sources of calcium  The amount of calcium you need is about 1,300 mg if you are between 15and 25years old and 1,000 mg if you are 23to 48years old  If you cannot drink milk or eat dairy foods, try lactose-free or lactose-reduced milk or calcium-fortified soy milk  Ask your dietitian about pills you can take to help you digest milk products  Eat other drinks and foods that are fortified with calcium, such as orange juice  What diet changes may help morning sickness? Morning sickness is common during the first few months of pregnancy  You may feel nauseated, and you may vomit several times each day  To improve symptoms of morning sickness, eat small meals often instead of 3 large meals  Foods high in carbohydrate, such as crackers, dry toast, and pasta, may be easier for you to eat  Drink liquids between meals rather than with meals  What diet changes may help constipation?   A high-fiber diet can improve the symptoms of constipation  Whole-grain breakfast cereals, whole-grain breads, and prune juice are high in fiber  Raw fruits and vegetables, and cooked beans are also good sources of fiber  It may also be helpful to increase your intake of fluids and get regular physical activity  Talk with your healthcare provider before you begin any exercise program        What diet changes may help heartburn? To improve the symptoms of heartburn, do not lie down right after you eat  When you do lie down, sleep with your head slightly elevated  Eat small, frequent meals instead of 3 large meals  It may also be helpful to avoid caffeine, chocolate, and spicy foods  What other healthy guidelines should I follow? · Make sure you get enough protein, vitamin B12, and iron if you are a vegetarian or vegan  Some non-meat sources of these nutrients are fortified cereals, nut butters, soy products (tofu and soymilk), nuts, grains, and legumes  These nutrients are also found in eggs and milk products  · Talk to your dietitian about how to manage cravings for certain foods  Foods that are high in calories, fat, and sugar should not replace healthy food choices  Some women have cravings for unusual substances such as baltazar, dirt, laundry starch, ice, and chalk  This condition is called pica  These may lead to health problems such as anemia and cause other health problems  When should I call my dietitian or obstetrician? · You are losing weight without trying  · You have cravings for substances such as baltazar, dirt, laundry starch, or ice  · You have questions or concerns about your condition or care  CARE AGREEMENT:   You have the right to help plan your care  Discuss treatment options with your healthcare provider to decide what care you want to receive  You always have the right to refuse treatment  The above information is an  only   It is not intended as medical advice for individual conditions or treatments  Talk to your doctor, nurse or pharmacist before following any medical regimen to see if it is safe and effective for you  © Copyright [employer] 2021 Information is for End User's use only and may not be sold, redistributed or otherwise used for commercial purposes  All illustrations and images included in CareNotes® are the copyrighted property of [employer]  or Tanvi [employer]   Pregnancy   AMBULATORY CARE:   What you need to know about pregnancy:  A normal pregnancy lasts about 40 weeks  The first trimester lasts from your last period through the 12th week of pregnancy  The second trimester lasts from the 13th week through the 23rd week  The third trimester lasts from the 24th week until your baby is born  If you know the date of your last period, your healthcare provider can estimate your due date  You may give birth to your baby any time from 37 weeks to 2 weeks after your due date  Seek care immediately if:   · You develop a severe headache that does not go away  · You have new or increased vision changes, such as blurred or spotted vision  · You have new or increased swelling in your face or hands  · You have pain or cramping in your abdomen or low back  · You have vaginal bleeding  Call your doctor or obstetrician if:   · You have abdominal cramps, pressure, or tightening  · You have a change in vaginal discharge  · You cannot keep food or drinks down, and you are losing weight  · You have chills or a fever  · You have vaginal itching, burning, or pain  · You have yellow, green, white, or foul-smelling vaginal discharge  · You have pain or burning when you urinate, less urine than usual, or pink or bloody urine  · You have questions or concerns about your condition or care  Prenatal care:  Prenatal care is a series of visits with your healthcare provider throughout your pregnancy   Prenatal care can help prevent problems during pregnancy and childbirth  At each prenatal visit, your provider will weigh you and check your blood pressure  He or she will also check your baby's heartbeat and growth  You may also need the following at some visits:  · A pelvic exam  allows your healthcare provider to see your cervix (the bottom part of your uterus)  Your healthcare provider will use a speculum to open your vagina  He or she will check the size and shape of your uterus  At your first prenatal visit, you may also have a Pap smear  This is a test to check your cervix for abnormal cells  · Blood tests  may be done to check for any of the following:     ? Gestational diabetes or anemia (low iron level)    ? Blood type or Rh factor, or certain birth defects    ? Immunity to certain diseases, such as chickenpox or rubella    ? An infection, such as a sexually transmitted infection, HIV, or hepatitis B    · Hepatitis B  may need to be prevented or treated  Hepatitis B is inflammation of the liver caused by the hepatitis B virus (HBV)  HBV can spread from a mother to her baby during delivery  You will be checked for HBV as early as possible in the first trimester of each pregnancy  You need the test even if you received the hepatitis B vaccine or were tested before  You may need to have an HBV infection treated before you give birth  · Urine tests  may also be done to check for sugar and protein  These can be signs of gestational diabetes or preeclampsia  Urine tests may also be done to check for signs of infection  · A fetal ultrasound  shows pictures of your baby inside your uterus  The pictures are used to check your baby's development, movement, and position  · Genetic disorder screening tests  may be offered to you  These tests check your baby's risk for genetic disorders such as Down syndrome  A screening test may include blood tests and an ultrasound  Blood tests may be used to check your DNA or your partner's DNA  Genetic tests are not always accurate or complete  Your baby may be born with a genetic disorder that did not show up in the tests  Talk to your healthcare provider about any concerns you have with genetic testing  Body changes that may occur during your pregnancy:   · Breast changes  you will experience include tenderness and tingling during the early part of your pregnancy  Your breasts will become larger  You may need to use a support bra  You may see a thin, yellow fluid, called colostrum, leak from your nipples during the second trimester  Colostrum is a liquid that changes to milk about 3 days after you give birth  · Skin changes and stretch marks  may occur during your pregnancy  You may have red marks, called stretch marks, on your skin  Stretch marks will usually fade after pregnancy  Use lotion if your skin is dry and itchy  The skin on your face, around your nipples, and below your belly button may darken  Most of the time, your skin will return to its normal color after your baby is born  · Morning sickness  is nausea and vomiting that can happen at any time of day  Avoid fatty and spicy foods  Eat small meals throughout the day instead of large meals  Bertha may help to decrease nausea  Ask your healthcare provider about other ways of decreasing nausea and vomiting  · Heartburn  may be caused by changes in your hormones during pregnancy  Your growing uterus may also push your stomach upward and force stomach acid to back up into your esophagus  Eat 4 or 5 small meals each day instead of large meals  Avoid spicy foods  Avoid eating right before bedtime  · Constipation  may develop during your pregnancy  To treat constipation, eat foods high in fiber such as fiber cereals, beans, fruits, vegetables, whole-grain breads, and prune juice  Get regular exercise and drink plenty of water  Your healthcare provider may also suggest a fiber supplement to soften your bowel movements   Talk to your healthcare provider before you use any medicines to decrease constipation  · Hemorrhoids  are enlarged veins in the rectal area  They may cause pain, itching, and bright red bleeding from your rectum  To decrease your risk for hemorrhoids, prevent constipation and do not strain to have a bowel movement  If you have hemorrhoids, soak in a tub of warm water to ease discomfort  Ask your healthcare provider how you can treat hemorrhoids  · Leg cramps and swelling  may be caused by low calcium levels or the added weight of pregnancy  Raise your legs above the level of your heart to decrease swelling  During a leg cramp, stretch or massage the muscle that has the cramp  Heat may help decrease pain and muscle spasms  Apply heat on your muscle for 20 to 30 minutes every 2 hours for as many days as directed  · Back pain  may occur as your baby grows  Do not stand for long periods of time or lift heavy items  Use good posture while you stand, squat, or bend  Wear low-heeled shoes with good support  Rest may also help to relieve back pain  Ask your healthcare provider about exercises you can do to strengthen your back muscles  Stay healthy during your pregnancy:       · Eat a variety of healthy foods  Healthy foods include fruits, vegetables, whole-grain breads, low-fat dairy foods, beans, lean meats, and fish  Drink liquids as directed  Ask how much liquid to drink each day and which liquids are best for you  Limit caffeine to less than 200 milligrams each day  Limit your intake of fish to 2 servings each week  Choose fish low in mercury such as canned light tuna, shrimp, crab, salmon, cod, or tilapia  Do not  eat fish high in mercury such as swordfish, tilefish, jennifer mackerel, and shark  · Take prenatal vitamins as directed  Your need for certain vitamins and minerals, such as folic acid, increases during pregnancy   Prenatal vitamins provide some of the extra vitamins and minerals you need  Prenatal vitamins may also help to decrease the risk for certain birth defects  · Ask how much weight you should gain during your pregnancy  Too much or too little weight gain can be unhealthy for you and your baby  · Talk to your healthcare provider about exercise  Moderate exercise can help you stay fit  Your healthcare provider will help you plan an exercise program that is safe for you during pregnancy  · Do not smoke  Smoking increases your risk for a miscarriage and heart and blood vessel problems  Smoking can cause your baby to be born too early or weigh less at birth  Quit smoking as soon as you think you might be pregnant  Ask your healthcare provider for information if you need help quitting  · Do not drink alcohol  Alcohol passes from your body to your baby through the placenta  It can affect your baby's brain development and cause fetal alcohol syndrome (FAS)  FAS is a group of conditions that causes mental, behavior, and growth problems  · Talk to your healthcare provider before you take any medicines  Many medicines may harm your baby if you take them when you are pregnant  Do not take any medicines, vitamins, herbs, or supplements without first talking to your healthcare provider  Never use illegal or street drugs (such as marijuana or cocaine) while you are pregnant  Safety tips:   · Avoid hot tubs and saunas  Do not use a hot tub or sauna while you are pregnant, especially during your first trimester  Hot tubs and saunas may raise your baby's temperature and increase the risk for birth defects  · Avoid toxoplasmosis  This is an infection caused by eating raw meat or being around infected cat feces  It can cause birth defects, miscarriages, and other problems  Wash your hands after you touch raw meat  Make sure any meat is well-cooked before you eat it  Avoid raw eggs and unpasteurized milk   Use gloves or ask someone else to clean your cat's litter box while you are pregnant  · Ask your healthcare provider about travel  The most comfortable time to travel is during the second trimester  Ask your healthcare provider if you can travel after 36 weeks  You may not be able to travel in an airplane after 36 weeks  He or she may also recommend that you avoid long road trips  Follow up with your doctor or obstetrician as directed:  Go to all of your prenatal visits during your pregnancy  Write down your questions so you remember to ask them during your visits  © Copyright [employer] 2021 Information is for End User's use only and may not be sold, redistributed or otherwise used for commercial purposes  All illustrations and images included in CareNotes® are the copyrighted property of [employer]  or Tanvi Guerra   The above information is an  only  It is not intended as medical advice for individual conditions or treatments  Talk to your doctor, nurse or pharmacist before following any medical regimen to see if it is safe and effective for you  Nausea and Vomiting in Pregnancy   WHAT YOU NEED TO KNOW:   Nausea and vomiting can happen any time of day  These symptoms usually start before the 9th week of pregnancy, and end by the 14th week (second trimester)  Some women can have nausea and vomiting for a longer time  These symptoms can affect some women throughout the entire pregnancy  Nausea and vomiting do not harm your baby  These symptoms can make it hard for you to do your daily activities  DISCHARGE INSTRUCTIONS:   Return to the emergency department if:   · You have signs of dehydration  Examples are dark yellow urine, dry mouth and lips, dry skin, fast heartbeat, and urinating less than usual     · You have severe abdominal pain  · You feel too weak or dizzy to stand up  · You see blood in your vomit or bowel movements  Call your doctor if:   · You vomit more than 4 times in 1 day      · You have not been able to keep liquids down for more than 1 day  · You lose more than 2 pounds  · You have a fever  · Your nausea and vomiting continue longer than 14 weeks  · You have questions or concerns about your condition or care  Nutrition changes you can make to manage nausea and vomiting:   · Eat small meals throughout the day instead of 3 large meals  You may be more likely to have nausea and vomiting when your stomach is empty  Eat foods that are low in fat and high in protein  Examples are lean meat, beans, turkey, and chicken without the skin  Eat a small snack, such as crackers, dry cereal, or a small sandwich before you go to bed  · Eat some crackers or dry toast before you get out of bed in the morning  Get out of bed slowly  Sudden movements could cause you to get dizzy and nauseated  · Eat bland foods when you feel nauseated  Examples of bland foods include dry toast, dry cereal, plain pasta, white rice, and bread  Other bland foods include saltine crackers, bananas, gelatin, and pretzels  Avoid spicy, greasy, and fried foods  Avoid any other foods that make you feel nauseated  · Drink liquids that contain ginger  Drink ginger ale made with real ginger or ginger tea made with fresh grated ginger  Ginger capsules or ginger candies may also help to decrease nausea and vomiting  · Drink liquids between meals instead of with meals  Wait at least 30 minutes after you eat to drink liquids  Drink small amounts of liquids often throughout the day to prevent dehydration  Ask how much liquid you should drink each day  Other changes you can make to manage nausea and vomiting:   · Avoid smells that bother you  Strong odors may cause nausea and vomiting to start, or make it worse  Take a short walk, turn on a fan, or try to sleep with the window open to get fresh air  When you are cooking, open windows to get rid of smells that may cause nausea      · Do not brush your teeth right after you eat  if it makes you nauseated  · Rest when you need to  Start activity slowly and work up to your usual routine as you start to feel better  · Talk to your healthcare provider about your prenatal vitamins  Prenatal vitamins can cause nausea for some women  Try taking your prenatal vitamin at night or with a snack  If this change does not help, your healthcare provider may recommend a different type of vitamin  · Do not use any medicines, vitamins, or supplements to manage your symptoms without asking your healthcare provider  Many medicines can harm an unborn baby  · Light to moderate exercise  may help to decrease your symptoms  It may also help you to sleep better at night  Ask your healthcare provider about the best exercise plan for you  Follow up with your doctor as directed:  Write down your questions so you remember to ask them during your visits  © Copyright [employer] 2021 Information is for End User's use only and may not be sold, redistributed or otherwise used for commercial purposes  All illustrations and images included in CareNotes® are the copyrighted property of A D A M , Inc  or [facility] Rachel Guerra   The above information is an  only  It is not intended as medical advice for individual conditions or treatments  Talk to your doctor, nurse or pharmacist before following any medical regimen to see if it is safe and effective for you

## 2022-02-14 LAB
C TRACH RRNA SPEC QL NAA+PROBE: NEGATIVE
N GONORRHOEA RRNA SPEC QL NAA+PROBE: NEGATIVE

## 2022-02-16 LAB
EXTERNAL ANTIBODY SCREEN: NORMAL
EXTERNAL HEMOGLOBIN: 11.6 G/DL
EXTERNAL HEPATITIS B SURFACE ANTIGEN: NEGATIVE
EXTERNAL HIV-1/2 AB-AG: NORMAL
EXTERNAL PLATELET COUNT: 258 K/ΜL
EXTERNAL RH FACTOR: NEGATIVE
EXTERNAL RUBELLA IGG QUANTITATION: NORMAL
EXTERNAL SYPHILIS RPR SCREEN: NORMAL

## 2022-02-17 ENCOUNTER — TELEPHONE (OUTPATIENT)
Dept: OBGYN CLINIC | Facility: CLINIC | Age: 27
End: 2022-02-17

## 2022-02-17 NOTE — TELEPHONE ENCOUNTER
Pt is currently 8w6d GA and calls with concerns of nausea/vomitng that started yesterday afternoon  Pt reports she has been experiencing  "morning sickness" but came home from work yesterday and had episodes of nausea and vomiting, unable to keep anything down, and mild pelvic cramping  Pt reports she has been able to keep sips of water down this morning  Pt unsure if she had a  "stomach virus," feel she may have had a low grade fever, denies diarrhea  Pt reports she is urinating within an 8 hour period  Advised if she is unable to keep any fluids down within a 12 hour period, contact the office or proceed to ER for IV hydration  Pt denies any spotting or bleeding  Advised to call the office if pelvic cramping increases, explained it is not uncommon to have mild pelvic cramping in early pregnancy but also can be a sign of dehydration or call if she has any spotting    Recommended to monitor, BRAT diet, carbonated drinks, bland foods, and in addition for morning sickness, try to keep stomach full at all times, 5-6 jenna meals a day, incorporate protein with meals, push Po fluids ,Vit B6 25 mg TID-directed to teaching materials (safe medication list) to add Unisom/B6 combination, Bertha, sips of peppermint, and call the office if symptoms persist

## 2022-02-18 PROBLEM — O26.899 RH NEGATIVE STATE IN ANTEPARTUM PERIOD: Status: ACTIVE | Noted: 2022-02-18

## 2022-02-18 PROBLEM — Z67.91 RH NEGATIVE STATE IN ANTEPARTUM PERIOD: Status: ACTIVE | Noted: 2022-02-18

## 2022-02-18 LAB
ABO GROUP BLD: ABNORMAL
AMPHETAMINES UR QL SCN: NEGATIVE NG/ML
APPEARANCE UR: CLEAR
BACTERIA UR CULT: NORMAL
BACTERIA URNS QL MICRO: NORMAL
BARBITURATES UR QL SCN: NEGATIVE NG/ML
BASOPHILS # BLD AUTO: 0 X10E3/UL (ref 0–0.2)
BASOPHILS NFR BLD AUTO: 0 %
BENZODIAZ UR QL: NEGATIVE NG/ML
BILIRUB UR QL STRIP: NEGATIVE
BLD GP AB SCN SERPL QL: NEGATIVE
BZE UR QL: NEGATIVE NG/ML
CANNABINOIDS UR QL SCN: NEGATIVE NG/ML
CASTS URNS QL MICRO: NORMAL /LPF
COLOR UR: YELLOW
EOSINOPHIL # BLD AUTO: 0 X10E3/UL (ref 0–0.4)
EOSINOPHIL NFR BLD AUTO: 1 %
EPI CELLS #/AREA URNS HPF: NORMAL /HPF (ref 0–10)
ERYTHROCYTE [DISTWIDTH] IN BLOOD BY AUTOMATED COUNT: 12.3 % (ref 11.7–15.4)
GLUCOSE UR QL: NEGATIVE
HBV SURFACE AG SERPL QL IA: NEGATIVE
HCT VFR BLD AUTO: 34.9 % (ref 34–46.6)
HCV AB S/CO SERPL IA: >11 S/CO RATIO (ref 0–0.9)
HGB BLD-MCNC: 11.6 G/DL (ref 11.1–15.9)
HGB UR QL STRIP: NEGATIVE
HIV 1+2 AB+HIV1 P24 AG SERPL QL IA: NON REACTIVE
IMM GRANULOCYTES # BLD: 0 X10E3/UL (ref 0–0.1)
IMM GRANULOCYTES NFR BLD: 0 %
KETONES UR QL STRIP: NEGATIVE
LEUKOCYTE ESTERASE UR QL STRIP: NEGATIVE
LYMPHOCYTES # BLD AUTO: 1.6 X10E3/UL (ref 0.7–3.1)
LYMPHOCYTES NFR BLD AUTO: 19 %
Lab: NORMAL
MCH RBC QN AUTO: 31.7 PG (ref 26.6–33)
MCHC RBC AUTO-ENTMCNC: 33.2 G/DL (ref 31.5–35.7)
MCV RBC AUTO: 95 FL (ref 79–97)
METHADONE UR QL SCN: NEGATIVE NG/ML
MICRO URNS: NORMAL
MICRO URNS: NORMAL
MONOCYTES # BLD AUTO: 0.6 X10E3/UL (ref 0.1–0.9)
MONOCYTES NFR BLD AUTO: 7 %
NEUTROPHILS # BLD AUTO: 5.9 X10E3/UL (ref 1.4–7)
NEUTROPHILS NFR BLD AUTO: 73 %
NITRITE UR QL STRIP: NEGATIVE
OPIATES UR QL: NEGATIVE NG/ML
PCP UR QL: NEGATIVE NG/ML
PH UR STRIP: 5.5 [PH] (ref 5–7.5)
PLATELET # BLD AUTO: 258 X10E3/UL (ref 150–450)
PROPOXYPH UR QL SCN: NEGATIVE NG/ML
PROT UR QL STRIP: NEGATIVE
RBC # BLD AUTO: 3.66 X10E6/UL (ref 3.77–5.28)
RBC #/AREA URNS HPF: NORMAL /HPF (ref 0–2)
RH BLD: NEGATIVE
RPR SER QL: NON REACTIVE
RUBV IGG SERPL IA-ACNC: 1.53 INDEX
SP GR UR: 1.02 (ref 1–1.03)
UROBILINOGEN UR STRIP-ACNC: 0.2 MG/DL (ref 0.2–1)
WBC # BLD AUTO: 8.2 X10E3/UL (ref 3.4–10.8)
WBC #/AREA URNS HPF: NORMAL /HPF (ref 0–5)

## 2022-02-21 DIAGNOSIS — R76.8 HEPATITIS C ANTIBODY POSITIVE IN BLOOD: Primary | ICD-10-CM

## 2022-02-28 LAB
HCV RNA SERPL NAA+PROBE-ACNC: NORMAL IU/ML
TEST INFORMATION: NORMAL

## 2022-03-10 ENCOUNTER — ROUTINE PRENATAL (OUTPATIENT)
Dept: OBGYN CLINIC | Facility: CLINIC | Age: 27
End: 2022-03-10

## 2022-03-10 VITALS
BODY MASS INDEX: 29.48 KG/M2 | HEIGHT: 63 IN | WEIGHT: 166.4 LBS | DIASTOLIC BLOOD PRESSURE: 72 MMHG | SYSTOLIC BLOOD PRESSURE: 118 MMHG

## 2022-03-10 DIAGNOSIS — Z67.91 RH NEGATIVE STATE IN ANTEPARTUM PERIOD: ICD-10-CM

## 2022-03-10 DIAGNOSIS — Z3A.11 11 WEEKS GESTATION OF PREGNANCY: Primary | ICD-10-CM

## 2022-03-10 DIAGNOSIS — R76.8 HEPATITIS C ANTIBODY POSITIVE IN BLOOD: ICD-10-CM

## 2022-03-10 DIAGNOSIS — O26.899 RH NEGATIVE STATE IN ANTEPARTUM PERIOD: ICD-10-CM

## 2022-03-10 DIAGNOSIS — Z87.898 HISTORY OF DRUG USE: ICD-10-CM

## 2022-03-10 LAB
SL AMB  POCT GLUCOSE, UA: NEGATIVE
SL AMB POCT URINE PROTEIN: NEGATIVE

## 2022-03-10 PROCEDURE — PNV: Performed by: OBSTETRICS & GYNECOLOGY

## 2022-03-10 NOTE — PROGRESS NOTES
Routine Prenatal Visit  [address], [facility], [address] [facility]    Assessment/Plan:  Remigio Lutz is a 27y o  year old  at [address] [place] [address] who presents for routine prenatal visit  1  11 weeks gestation of pregnancy  -     POCT urine dip    2  Rh negative state in antepartum period    3  History of drug use    4  Hepatitis C antibody positive in blood      Pat aware RNA is negative, therefore no active Hep C    Next OB Visit 4 weeks  Subjective:     CC: Prenatal care    Marline Crump is a 27 y o   female who presents for routine prenatal care at [address] [place] [address]  Pregnancy ROS: no leakage of fluid, pelvic pain, or vaginal bleeding  nml fetal movement  The following portions of the patient's history were reviewed and updated as appropriate: allergies, current medications, past family history, past medical history, obstetric history, gynecologic history, past social history, past surgical history and problem list       Objective:  /72 (BP Location: Right arm, Patient Position: Sitting, Cuff Size: Standard)   Ht 5' 3" (1 6 m)   Wt 75 5 kg (166 lb 6 4 oz)   LMP 2021 (Exact Date)   BMI 29 48 kg/m²   Pregravid Weight/BMI: 73 9 kg (163 lb) (BMI 28 88)  Current Weight: 75 5 kg (166 lb 6 4 oz)   Total Weight Gain: 1 542 kg (3 lb 6 4 oz)   Pre- Vitals      Most Recent Value   Prenatal Assessment    Fetal Heart Rate 140-150   Fundal Height (cm) 12 cm   Movement Absent   Prenatal Vitals    Blood Pressure 118/72   Weight - Scale 75 5 kg (166 lb 6 4 oz)   Urine Albumin/Glucose    Dilation/Effacement/Station    Vaginal Drainage    Draining Fluid No   Edema            General: Well appearing, no distress  Abdomen: Soft, gravid, nontender  Fundal Height: Appropriate for gestational age  Extremities: Warm and well perfused  Non tender

## 2022-03-16 ENCOUNTER — ROUTINE PRENATAL (OUTPATIENT)
Dept: PERINATAL CARE | Facility: OTHER | Age: 27
End: 2022-03-16
Payer: COMMERCIAL

## 2022-03-16 VITALS
DIASTOLIC BLOOD PRESSURE: 61 MMHG | WEIGHT: 171 LBS | HEART RATE: 80 BPM | BODY MASS INDEX: 30.3 KG/M2 | SYSTOLIC BLOOD PRESSURE: 109 MMHG | HEIGHT: 63 IN

## 2022-03-16 DIAGNOSIS — Z36.82 NUCHAL TRANSLUCENCY OF FETUS ON PRENATAL ULTRASOUND: ICD-10-CM

## 2022-03-16 DIAGNOSIS — Z87.898 HISTORY OF DRUG USE: ICD-10-CM

## 2022-03-16 DIAGNOSIS — Z3A.12 12 WEEKS GESTATION OF PREGNANCY: Primary | ICD-10-CM

## 2022-03-16 DIAGNOSIS — R76.8 HEPATITIS C ANTIBODY POSITIVE IN BLOOD: ICD-10-CM

## 2022-03-16 DIAGNOSIS — Z13.79 GENETIC SCREENING: ICD-10-CM

## 2022-03-16 DIAGNOSIS — O34.211 MATERNAL CARE DUE TO LOW TRANSVERSE UTERINE SCAR FROM PREVIOUS CESAREAN DELIVERY: ICD-10-CM

## 2022-03-16 DIAGNOSIS — O02.1 MISSED ABORTION WITH FETAL DEMISE BEFORE 20 COMPLETED WEEKS OF GESTATION: ICD-10-CM

## 2022-03-16 DIAGNOSIS — J45.20 MILD INTERMITTENT ASTHMA WITHOUT COMPLICATION: ICD-10-CM

## 2022-03-16 DIAGNOSIS — O99.331: ICD-10-CM

## 2022-03-16 PROCEDURE — 76813 OB US NUCHAL MEAS 1 GEST: CPT | Performed by: OBSTETRICS & GYNECOLOGY

## 2022-03-16 PROCEDURE — 99215 OFFICE O/P EST HI 40 MIN: CPT | Performed by: OBSTETRICS & GYNECOLOGY

## 2022-03-16 NOTE — PROGRESS NOTES
Patient chose to have Part 1 Sequential Screening from [employer]  Finger stick specimen collected from right middle finger and patient tolerated procedure well  Sample mailed to [facility] via [employer]  Explained results will be available in 7-10 business days  [facility] office will call her with results or she can view in [address]  Santa collection for Part 2 is between 16-18 weeks gestation, a lab slip and instruction letter will be mailed from our office  Patient instructed to take the paper lab slip to lab, this specialty genetic test is not yet in Epic  Patient verbalized understanding of all instructions

## 2022-03-16 NOTE — LETTER
March 18, 2022     Shi Art, [address]  [address]  [facility]    Patient: Itzel Martinez   YOB: 1995   Date of Visit: 3/16/2022       Dear Dr Kavitha Frazier: Thank you for referring Brigitte Bianchi to me for evaluation  Below are my notes for this consultation  If you have questions, please do not hesitate to call me  I look forward to following your patient along with you  Sincerely,        Tracee Garcia MD        CC: No Recipients  Tracee Garcia MD  3/18/2022 12:58 PM  Incomplete  A fetal ultrasound was completed  See Ob procedures in Epic for an interpretation and recommendations  Do not hesitate to contact us in [facility] if you have questions  Aris Gill MD, [facility]5 [facility]  Maternal Fetal Medicine

## 2022-03-18 PROBLEM — O99.331: Status: ACTIVE | Noted: 2022-03-18

## 2022-03-18 PROBLEM — Z3A.12 12 WEEKS GESTATION OF PREGNANCY: Status: ACTIVE | Noted: 2022-03-18

## 2022-03-18 NOTE — PROGRESS NOTES
A fetal ultrasound was completed  See Ob procedures in Epic for an interpretation and recommendations  Do not hesitate to contact us in [facility] if you have questions  Loan Vazquez MD, [address] [facility]  Maternal Fetal Medicine

## 2022-03-22 ENCOUNTER — TELEPHONE (OUTPATIENT)
Dept: PERINATAL CARE | Facility: CLINIC | Age: 27
End: 2022-03-22

## 2022-03-22 NOTE — TELEPHONE ENCOUNTER
----- Message from Marlon Foote MD sent at 3/21/2022  5:57 PM EDT -----  I have reviewed the patient's lab results which are normal   Please contact the patient to inform her of the normal results, unless Ms Yunier Millard has reviewed the results already using Faby Han MD

## 2022-03-22 NOTE — TELEPHONE ENCOUNTER
Spoke with pt and provided her with the results of her Part 1 Sequential Screen  Pt instructed on Part 2  Pt was receptive to all information and declines questions at this time  TRF and lab letter mailed to pt

## 2022-03-22 NOTE — LETTER
03/22/22  Tasha Huitron  1995    Thank you for completing Part 1 of your Sequential Screen  To obtain a complete test result, complete blood work for Part 2 Sequential Screen between the weeks of 04/08/22  to 04/22/22  Please verify which laboratory is In Network with your insurance plan ([facility] or [facility] [employer])      If you choose to use a [facility] lab, please go to a location from this list:     [address]  [address] [facility], [facility] [address]                [facility] 5, [facility] 425 [facility]  2639 [facility] [address] [place] [address] 19, [facility] 80 [facility]  [address], [place], [address] [place] [address]             [address], [address]  [address], [facility] [address]             819 [facility] [address]  1430 [facility], 119 [facility] [address]              [facility] [address] [place] [address] Drive 8400 [facility]  [address], [facility] [address]                        [address], [facility] [address] [facility]  [address], 185 [facility] [address]            207 [facility], [facility] 969 [facility]                            [address] G  [facility] 38, [facility] gap [address]    For list of [facility] locations [employer]   If you choose [facility], please remind the phlebotomist the screen is ordered for [facility]  You can take this letter with you to the lab  Call Maternal Fetal Medicine nurse line any questions at [phone]     Thank you,  [facility] Maternal Fetal Medicine Staff

## 2022-04-08 ENCOUNTER — ROUTINE PRENATAL (OUTPATIENT)
Dept: OBGYN CLINIC | Facility: CLINIC | Age: 27
End: 2022-04-08
Payer: COMMERCIAL

## 2022-04-08 VITALS
DIASTOLIC BLOOD PRESSURE: 72 MMHG | HEIGHT: 63 IN | SYSTOLIC BLOOD PRESSURE: 110 MMHG | BODY MASS INDEX: 29.95 KG/M2 | WEIGHT: 169 LBS

## 2022-04-08 DIAGNOSIS — R76.8 HEPATITIS C ANTIBODY POSITIVE IN BLOOD: ICD-10-CM

## 2022-04-08 DIAGNOSIS — O99.331: ICD-10-CM

## 2022-04-08 DIAGNOSIS — O26.899 RH NEGATIVE STATE IN ANTEPARTUM PERIOD: ICD-10-CM

## 2022-04-08 DIAGNOSIS — Z67.91 RH NEGATIVE STATE IN ANTEPARTUM PERIOD: ICD-10-CM

## 2022-04-08 DIAGNOSIS — O34.211 MATERNAL CARE DUE TO LOW TRANSVERSE UTERINE SCAR FROM PREVIOUS CESAREAN DELIVERY: Primary | ICD-10-CM

## 2022-04-08 DIAGNOSIS — F31.9 BIPOLAR AFFECTIVE DISORDER, REMISSION STATUS UNSPECIFIED (HCC): ICD-10-CM

## 2022-04-08 DIAGNOSIS — Z3A.16 16 WEEKS GESTATION OF PREGNANCY: ICD-10-CM

## 2022-04-08 DIAGNOSIS — Z87.898 HISTORY OF DRUG USE: ICD-10-CM

## 2022-04-08 LAB
SL AMB  POCT GLUCOSE, UA: NORMAL
SL AMB POCT URINE PROTEIN: NORMAL

## 2022-04-08 PROCEDURE — 99213 OFFICE O/P EST LOW 20 MIN: CPT | Performed by: STUDENT IN AN ORGANIZED HEALTH CARE EDUCATION/TRAINING PROGRAM

## 2022-04-08 NOTE — ASSESSMENT & PLAN NOTE
- Prenatal lab results reviewed  - Aneuploidy/ONTD screening reviewed  Part 1 of SS low risk  Due for part 2 in next 1-2 weeks  - First trimester MFM consult report reviewed  Level II ultrasound is scheduled  Per Dr Stefani Rapp consult, he had recommended early level II US at 22 weeks  Patient will call MFM to clarify and reschedule as appropriate  - Problem list updated, results console reviewed and updated with pertinent prenatal labs  - PMH, PSH, medications reviewed and updated as needed  - Return to office in 4 wk(s) for routine prenatal care

## 2022-04-08 NOTE — ASSESSMENT & PLAN NOTE
- s/p treatment  At the recommendation of [facility], a referral to GI for consultation during pregnancy was provided today

## 2022-04-08 NOTE — PROGRESS NOTES
Routine Prenatal Visit  [address], [facility], [address] [facility]    Assessment/Plan:  Easton Benítez is a 27y o  year old  at 16w0d who presents for routine prenatal visit  1  Maternal care due to low transverse uterine scar from previous  delivery  Assessment & Plan:  - Reviewed TOLAC vs repeat c/s  Patient considering TOLAC  She is overall a good candidate  2  Tobacco use complicating pregnancy, first trimester    3  Rh negative state in antepartum period    4  History of drug use    5  16 weeks gestation of pregnancy  Assessment & Plan:  - Prenatal lab results reviewed  - Aneuploidy/ONTD screening reviewed  Part 1 of SS low risk  Due for part 2 in next 1-2 weeks  - First trimester MFM consult report reviewed  Level II ultrasound is scheduled  Per Dr Gerald Au consult, he had recommended early level II US at 22 weeks  Patient will call MFM to clarify and reschedule as appropriate  - Problem list updated, results console reviewed and updated with pertinent prenatal labs  - PMH, PSH, medications reviewed and updated as needed  - Return to office in 4 wk(s) for routine prenatal care  Orders:  -     POCT urine dip  -     Ambulatory Referral to Gastroenterology; Future    6  Hepatitis C antibody positive in blood  Assessment & Plan:  - s/p treatment  At the recommendation of [facility], a referral to GI for consultation during pregnancy was provided today  Orders:  -     Ambulatory Referral to Gastroenterology; Future    7  Bipolar affective disorder, remission status unspecified ([facility] )    Subjective:   Jaswant Harris is a 27 y o   who presents for routine prenatal care at 16w0d    Complaints today: No  LOF: No; VB: No; Contractions: No; FM: Not yet    Objective:  /72 (BP Location: Left arm, Patient Position: Sitting, Cuff Size: Standard)   Ht 5' 3" (1 6 m)   Wt 76 7 kg (169 lb)   LMP 2021 (Exact Date)   BMI 29 94 kg/m² General: Well appearing, no distress  Respiratory: Unlabored breathing  Cardiovascular: Regular rate  Abdomen: Soft, gravid, nontender  Extremities: Warm and well perfused  Non tender      Pregravid Weight/BMI: 73 9 kg (163 lb) (BMI 28 88)  Current Weight: 76 7 kg (169 lb)   Total Weight Gain: 2 722 kg (6 lb)     Pre-Jasper Vitals      Most Recent Value   Prenatal Assessment    Fetal Heart Rate 155   Fundal Height (cm) 16 cm   Movement Absent   Prenatal Vitals    Blood Pressure 110/72   Weight - Scale 76 7 kg (169 lb)   Urine Albumin/Glucose    Dilation/Effacement/Station    Vaginal Drainage    Draining Fluid No   Edema    LLE Edema None   RLE Edema None   Facial Edema None           Jose Khan MD  2022 10:20 AM

## 2022-04-25 ENCOUNTER — TELEPHONE (OUTPATIENT)
Dept: PERINATAL CARE | Facility: OTHER | Age: 27
End: 2022-04-25

## 2022-04-25 NOTE — TELEPHONE ENCOUNTER
Left patient a message that her MFM appointment for 4/27/22 will include her LEVEL 2 US  The new time, date and location were provided - 4/27/22  9:00  [facility] [place]  The patient has been instructed to please call us back at [phone] with any questions or concerns

## 2022-04-26 ENCOUNTER — TELEPHONE (OUTPATIENT)
Dept: OBGYN CLINIC | Facility: CLINIC | Age: 27
End: 2022-04-26

## 2022-04-26 NOTE — TELEPHONE ENCOUNTER
Pricila Cordova called to inquire if she can use monistat OTC  C/O white thick vaginal discharge and vaginal itching  Advised safe to use monistat 7 OTC-keep open to air at HS, avoid tight clothing, change out of wet clothing asap, increase lemon water  Patient in agreement to plan   C/B if no improvement

## 2022-04-26 NOTE — PROGRESS NOTES
Please refer to the [facility] ultrasound report in Ob Procedures for additional information regarding today's visit

## 2022-04-27 ENCOUNTER — ROUTINE PRENATAL (OUTPATIENT)
Dept: PERINATAL CARE | Facility: OTHER | Age: 27
End: 2022-04-27
Payer: COMMERCIAL

## 2022-04-27 VITALS
DIASTOLIC BLOOD PRESSURE: 68 MMHG | BODY MASS INDEX: 30.19 KG/M2 | HEIGHT: 63 IN | HEART RATE: 85 BPM | SYSTOLIC BLOOD PRESSURE: 114 MMHG | WEIGHT: 170.4 LBS

## 2022-04-27 DIAGNOSIS — O98.412 CHRONIC VIRAL HEPATITIS COMPLICATING PREGNANCY IN SECOND TRIMESTER (HCC): ICD-10-CM

## 2022-04-27 DIAGNOSIS — B18.9 CHRONIC VIRAL HEPATITIS COMPLICATING PREGNANCY IN SECOND TRIMESTER (HCC): ICD-10-CM

## 2022-04-27 DIAGNOSIS — Z36.3 ENCOUNTER FOR ANTENATAL SCREENING FOR MALFORMATIONS: Primary | ICD-10-CM

## 2022-04-27 DIAGNOSIS — Z3A.18 18 WEEKS GESTATION OF PREGNANCY: ICD-10-CM

## 2022-04-27 DIAGNOSIS — Z36.86 ENCOUNTER FOR ANTENATAL SCREENING FOR CERVICAL LENGTH: ICD-10-CM

## 2022-04-27 PROCEDURE — 76817 TRANSVAGINAL US OBSTETRIC: CPT | Performed by: OBSTETRICS & GYNECOLOGY

## 2022-04-27 PROCEDURE — 99213 OFFICE O/P EST LOW 20 MIN: CPT | Performed by: OBSTETRICS & GYNECOLOGY

## 2022-04-27 PROCEDURE — 76805 OB US >/= 14 WKS SNGL FETUS: CPT | Performed by: OBSTETRICS & GYNECOLOGY

## 2022-04-27 NOTE — PROGRESS NOTES
Ultrasound Probe Disinfection    A transvaginal ultrasound was performed  Prior to use, disinfection was performed with High Level Disinfection Process (Trophon)  Probe serial number [id number] was used        Alphonzo Osgood  04/27/22  8:57 AM

## 2022-04-27 NOTE — LETTER
April 27, 2022     Santy Farooq MD  [facility]  [address] [facility]  [facility] [address]    Patient: Cristian Montaño   YOB: 1995   Date of Visit: 4/27/2022       Dear Dr Marvin Palacios:    Thank you for referring Garner Nyhan to me for evaluation  Below are my notes for this consultation  If you have questions, please do not hesitate to call me  I look forward to following your patient along with you  Sincerely,        Sinai Mace MD        CC: No Recipients  Sinai Mace MD  4/26/2022  7:47 PM  Sign when Signing Visit  Please refer to the [facility] ultrasound report in Ob Procedures for additional information regarding today's visit

## 2022-05-06 ENCOUNTER — ROUTINE PRENATAL (OUTPATIENT)
Dept: OBGYN CLINIC | Facility: CLINIC | Age: 27
End: 2022-05-06
Payer: COMMERCIAL

## 2022-05-06 VITALS
WEIGHT: 170 LBS | HEIGHT: 63 IN | SYSTOLIC BLOOD PRESSURE: 114 MMHG | DIASTOLIC BLOOD PRESSURE: 64 MMHG | BODY MASS INDEX: 30.12 KG/M2

## 2022-05-06 DIAGNOSIS — F31.9 BIPOLAR AFFECTIVE DISORDER, REMISSION STATUS UNSPECIFIED (HCC): ICD-10-CM

## 2022-05-06 DIAGNOSIS — Z67.91 RH NEGATIVE STATE IN ANTEPARTUM PERIOD: ICD-10-CM

## 2022-05-06 DIAGNOSIS — R76.8 HEPATITIS C ANTIBODY POSITIVE IN BLOOD: ICD-10-CM

## 2022-05-06 DIAGNOSIS — O99.331: ICD-10-CM

## 2022-05-06 DIAGNOSIS — Z87.898 HISTORY OF DRUG USE: ICD-10-CM

## 2022-05-06 DIAGNOSIS — O34.211 MATERNAL CARE DUE TO LOW TRANSVERSE UTERINE SCAR FROM PREVIOUS CESAREAN DELIVERY: ICD-10-CM

## 2022-05-06 DIAGNOSIS — Z3A.20 20 WEEKS GESTATION OF PREGNANCY: Primary | ICD-10-CM

## 2022-05-06 DIAGNOSIS — O26.899 RH NEGATIVE STATE IN ANTEPARTUM PERIOD: ICD-10-CM

## 2022-05-06 LAB
SL AMB  POCT GLUCOSE, UA: NORMAL
SL AMB POCT URINE PROTEIN: NORMAL

## 2022-05-06 PROCEDURE — 99213 OFFICE O/P EST LOW 20 MIN: CPT | Performed by: STUDENT IN AN ORGANIZED HEALTH CARE EDUCATION/TRAINING PROGRAM

## 2022-05-06 NOTE — PROGRESS NOTES
Routine Prenatal Visit  [address], [facility], [address] [facility]    Assessment/Plan:  Maryam Kim is a 27y o  year old  at 24w0d who presents for routine prenatal visit  1  20 weeks gestation of pregnancy  Assessment & Plan:  - PTL/PPROM/Bleeding precautions given  - MFM consult report from level II ultrasound reviewed  Repeat US is scheduled in 3rd trimester, per [facility] recommendations  - Problem list updated, results console reviewed and updated with pertinent prenatal labs  - PMH, PSH, medications reviewed and updated as needed  - Return to office in 4 wk(s) for routine prenatal care  Orders:  -     POCT urine dip    2  Hepatitis C antibody positive in blood  Assessment & Plan:  - Has follow up with GI scheduled, as recommended by [facility]    - Viral load undetectable this pregnancy  Will plan for repeat VL in 3rd trimester  3  Maternal care due to low transverse uterine scar from previous  delivery  Assessment & Plan:  - Considering TOLAC  4  Tobacco use complicating pregnancy, first trimester    5  Rh negative state in antepartum period    6  History of drug use    7  Bipolar affective disorder, remission status unspecified ([facility] )          Subjective:   Marycarmen Caldwell is a 27 y o   who presents for routine prenatal care at 20w0d  Complaints today: No  LOF: No; VB: No; Contractions: No; FM: Flutters    Objective:  /64 (BP Location: Left arm, Patient Position: Sitting, Cuff Size: Standard)   Ht 5' 3" (1 6 m)   Wt 77 1 kg (170 lb)   LMP 2021 (Exact Date)   BMI 30 11 kg/m²     General: Well appearing, no distress  Respiratory: Unlabored breathing  Cardiovascular: Regular rate  Abdomen: Soft, gravid, nontender  Extremities: Warm and well perfused  Non tender      Pregravid Weight/BMI: 73 9 kg (163 lb) (BMI 28 88)  Current Weight: 77 1 kg (170 lb)   Total Weight Gain: 3 175 kg (7 lb)     Pre-Jasper Vitals      Most Recent Value   Prenatal Assessment    Fetal Heart Rate 155   Fundal Height (cm) 20 cm   Movement Present   Prenatal Vitals    Blood Pressure 114/64   Weight - Scale 77 1 kg (170 lb)   Urine Albumin/Glucose    Dilation/Effacement/Station    Vaginal Drainage    Draining Fluid No   Edema    LLE Edema None   RLE Edema None   Facial Edema None           Merline Lefort, MD  5/6/2022 11:15 AM

## 2022-05-06 NOTE — ASSESSMENT & PLAN NOTE
- PTL/PPROM/Bleeding precautions given  - MFM consult report from level II ultrasound reviewed  Repeat US is scheduled in 3rd trimester, per [facility] recommendations  - Problem list updated, results console reviewed and updated with pertinent prenatal labs  - PMH, PSH, medications reviewed and updated as needed  - Return to office in 4 wk(s) for routine prenatal care

## 2022-05-06 NOTE — ASSESSMENT & PLAN NOTE
- Has follow up with GI scheduled, as recommended by MFM    - Viral load undetectable this pregnancy  Will plan for repeat VL in 3rd trimester

## 2022-05-18 ENCOUNTER — CONSULT (OUTPATIENT)
Dept: GASTROENTEROLOGY | Facility: CLINIC | Age: 27
End: 2022-05-18
Payer: COMMERCIAL

## 2022-05-18 ENCOUNTER — OFFICE VISIT (OUTPATIENT)
Dept: OBGYN CLINIC | Facility: CLINIC | Age: 27
End: 2022-05-18
Payer: COMMERCIAL

## 2022-05-18 VITALS
SYSTOLIC BLOOD PRESSURE: 112 MMHG | WEIGHT: 175 LBS | BODY MASS INDEX: 31.01 KG/M2 | HEIGHT: 63 IN | DIASTOLIC BLOOD PRESSURE: 70 MMHG

## 2022-05-18 VITALS
SYSTOLIC BLOOD PRESSURE: 116 MMHG | HEIGHT: 63 IN | DIASTOLIC BLOOD PRESSURE: 66 MMHG | BODY MASS INDEX: 31.25 KG/M2 | WEIGHT: 176.4 LBS

## 2022-05-18 DIAGNOSIS — M25.471 RIGHT ANKLE SWELLING: Primary | ICD-10-CM

## 2022-05-18 DIAGNOSIS — Z3A.16 16 WEEKS GESTATION OF PREGNANCY: ICD-10-CM

## 2022-05-18 DIAGNOSIS — Z86.19 HISTORY OF HEPATITIS C: Primary | ICD-10-CM

## 2022-05-18 DIAGNOSIS — R76.8 HEPATITIS C ANTIBODY POSITIVE IN BLOOD: ICD-10-CM

## 2022-05-18 PROCEDURE — 99214 OFFICE O/P EST MOD 30 MIN: CPT | Performed by: INTERNAL MEDICINE

## 2022-05-18 PROCEDURE — 99213 OFFICE O/P EST LOW 20 MIN: CPT | Performed by: ORTHOPAEDIC SURGERY
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
## 2022-05-18 NOTE — ASSESSMENT & PLAN NOTE
Findings today are consistent with right ankle swelling, possible new peroneal ganglion cyst   Prognosis was reviewed with the patient today  Discussed that ganglion cyst can reoccur  Her presentation seemed to have a new ganglion cyst developed over anterolateral aspect of the right ankle  At this time she should monitor her symptoms- if the cyst get larger in size or becomes painful, we could try aspiration and cortisone injection  Patient can follow up as needed  All patient's questions were answered to her satisfaction  This note is created using dictation transcription  It may contain typographical errors, grammatical errors, improperly dictated words, background noise and other errors
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
## 2022-05-30 PROBLEM — Z3A.23 23 WEEKS GESTATION OF PREGNANCY: Status: ACTIVE | Noted: 2022-03-18

## 2022-05-31 ENCOUNTER — TELEPHONE (OUTPATIENT)
Dept: OBGYN CLINIC | Facility: CLINIC | Age: 27
End: 2022-05-31

## 2022-05-31 NOTE — TELEPHONE ENCOUNTER
She should reschedule appt to beginning of next week  If she develops symptoms and tests +,  she will need to call us and  we will  re-evaluate   Will need to wear mask to office

## 2022-06-06 ENCOUNTER — TELEPHONE (OUTPATIENT)
Dept: OBGYN CLINIC | Facility: CLINIC | Age: 27
End: 2022-06-06

## 2022-06-06 NOTE — TELEPHONE ENCOUNTER
Rip Joseph called to cancel her 24 week apt because she tested positive for Covid  Her son had it last week  She has lower back pain which she is using tylenol and heat/ice on and off during the day  She has very mild symptoms  Some head and chest congestion  She is using saline nasal spray  Informed her she can use allergy type of medications like Claritin, zyrtec, allegra for congestion, neti pots, nasal Flonase  If she does develop a cough went over the use of regular Mucinex, and Robutissin  If anything changes or symptoms worsen please contact he office back  Rip Joseph only has one covid vaccine administered still needs second and a booster  Transferred back to the  to set up new 24 week apt

## 2022-06-14 ENCOUNTER — ROUTINE PRENATAL (OUTPATIENT)
Dept: OBGYN CLINIC | Facility: CLINIC | Age: 27
End: 2022-06-14
Payer: COMMERCIAL

## 2022-06-14 VITALS
HEIGHT: 63 IN | WEIGHT: 177.6 LBS | SYSTOLIC BLOOD PRESSURE: 102 MMHG | BODY MASS INDEX: 31.47 KG/M2 | DIASTOLIC BLOOD PRESSURE: 60 MMHG

## 2022-06-14 DIAGNOSIS — U07.1 COVID-19 AFFECTING PREGNANCY, ANTEPARTUM: ICD-10-CM

## 2022-06-14 DIAGNOSIS — O98.519 COVID-19 AFFECTING PREGNANCY, ANTEPARTUM: ICD-10-CM

## 2022-06-14 DIAGNOSIS — Z36.89 ENCOUNTER FOR OTHER SPECIFIED ANTENATAL SCREENING: ICD-10-CM

## 2022-06-14 DIAGNOSIS — Z67.91 RH NEGATIVE STATE IN ANTEPARTUM PERIOD: ICD-10-CM

## 2022-06-14 DIAGNOSIS — R76.8 HEPATITIS C ANTIBODY POSITIVE IN BLOOD: Primary | ICD-10-CM

## 2022-06-14 DIAGNOSIS — O26.899 RH NEGATIVE STATE IN ANTEPARTUM PERIOD: ICD-10-CM

## 2022-06-14 DIAGNOSIS — Z3A.25 25 WEEKS GESTATION OF PREGNANCY: ICD-10-CM

## 2022-06-14 DIAGNOSIS — O34.211 MATERNAL CARE DUE TO LOW TRANSVERSE UTERINE SCAR FROM PREVIOUS CESAREAN DELIVERY: ICD-10-CM

## 2022-06-14 LAB
SL AMB  POCT GLUCOSE, UA: NEGATIVE
SL AMB POCT URINE PROTEIN: NEGATIVE

## 2022-06-14 PROCEDURE — 99213 OFFICE O/P EST LOW 20 MIN: CPT | Performed by: OBSTETRICS & GYNECOLOGY

## 2022-06-14 PROCEDURE — 81002 URINALYSIS NONAUTO W/O SCOPE: CPT | Performed by: OBSTETRICS & GYNECOLOGY

## 2022-06-14 NOTE — PROGRESS NOTES
Routine Prenatal Visit  [address], [facility], [address] [facility]    Assessment/Plan:  Dariusz Estrada is a 27y o  year old  at 21w3d who presents for routine prenatal visit  1  Hepatitis C antibody positive in blood  Assessment & Plan:  H/o Hep C  Viral RNA neg if first trimester  Pt saw GI 2022  They ordered repeat RNA load  Encouraged pt to do with 28 week labs  2  Maternal care due to low transverse uterine scar from previous  delivery  Assessment & Plan:  Wishes TOLAC  3  COVID-19 affecting pregnancy, antepartum  Assessment & Plan:  Covid infection early   Feeling normal now  Has growth u/s scheduled 2022       4  Rh negative state in antepartum period  Assessment & Plan:  Orders for Antibody screen at 28 weeks  Reviewed Rhogam next visit  5  Encounter for other specified  screening  -     Glucose, 1H PG; Future  -     ABO/Rh; Future  -     Antibody screen; Future  -     CBC; Future  -     RPR, Rfx Qn RPR/Confirm TP; Future  -     Glucose, 1H PG  -     ABO/Rh  -     Antibody screen  -     CBC  -     RPR, Rfx Qn RPR/Confirm TP    6  25 weeks gestation of pregnancy  Assessment & Plan:  Reviewed Adacel vaccine next visit  Orders:  -     POCT urine dip        Next OB Visit 3 weeks  Subjective:     CC: Prenatal care    Jordan Flor is a 27 y o   female who presents for routine prenatal care at 25w4d  Pregnancy ROS: no leakage of fluid, pelvic pain, or vaginal bleeding  normal fetal movement      The following portions of the patient's history were reviewed and updated as appropriate: allergies, current medications, past family history, past medical history, obstetric history, gynecologic history, past social history, past surgical history and problem list       Objective:  /60   Ht 5' 3" (1 6 m)   Wt 80 6 kg (177 lb 9 6 oz)   LMP 2021 (Exact Date)   BMI 31 46 kg/m²   Pregravid Weight/BMI: 73 9 kg (163 lb) (BMI 28 88)  Current Weight: 80 6 kg (177 lb 9 6 oz)   Total Weight Gain: 6 623 kg (14 lb 9 6 oz)   Pre- Vitals    Flowsheet Row Most Recent Value   Prenatal Assessment    Fetal Heart Rate 140   Fundal Height (cm) 25 cm   Movement Present   Prenatal Vitals    Blood Pressure 102/60   Weight - Scale 80 6 kg (177 lb 9 6 oz)   Urine Albumin/Glucose    Dilation/Effacement/Station    Vaginal Drainage    Edema    LLE Edema Trace   RLE Edema Trace   Facial Edema None           General: Well appearing, no distress  Abdomen: Soft, gravid, nontender  Fundal Height: Appropriate for gestational age  Extremities: Warm and well perfused  Non tender

## 2022-06-14 NOTE — ASSESSMENT & PLAN NOTE
H/o Hep C  Viral RNA neg if first trimester  Pt saw GI 5/2022  They ordered repeat RNA load  Encouraged pt to do with 28 week labs

## 2022-06-30 LAB
EXTERNAL ABO GROUPING: NORMAL
EXTERNAL ABO GROUPING: NORMAL
EXTERNAL ANTIBODY SCREEN: NORMAL
EXTERNAL ANTIBODY SCREEN: NORMAL
EXTERNAL HEMOGLOBIN: 11.1 G/DL
EXTERNAL HEMOGLOBIN: 11.1 G/DL
EXTERNAL PLATELET COUNT: 249 K/ΜL
EXTERNAL PLATELET COUNT: 249 K/ΜL
EXTERNAL RH FACTOR: NEGATIVE
EXTERNAL RH FACTOR: NEGATIVE
EXTERNAL SYPHILIS RPR SCREEN: NORMAL
EXTERNAL SYPHILIS RPR SCREEN: NORMAL

## 2022-07-01 LAB
ABO GROUP BLD: NORMAL
BLD GP AB SCN SERPL QL: NEGATIVE
ERYTHROCYTE [DISTWIDTH] IN BLOOD BY AUTOMATED COUNT: 12.5 % (ref 11.7–15.4)
GLUCOSE 1H P 50 G GLC PO SERPL-MCNC: 123 MG/DL (ref 65–139)
HCT VFR BLD AUTO: 32.8 % (ref 34–46.6)
HCV RNA SERPL NAA+PROBE-ACNC: NORMAL IU/ML
HGB BLD-MCNC: 11.1 G/DL (ref 11.1–15.9)
MCH RBC QN AUTO: 31.8 PG (ref 26.6–33)
MCHC RBC AUTO-ENTMCNC: 33.8 G/DL (ref 31.5–35.7)
MCV RBC AUTO: 94 FL (ref 79–97)
PLATELET # BLD AUTO: 249 X10E3/UL (ref 150–450)
RBC # BLD AUTO: 3.49 X10E6/UL (ref 3.77–5.28)
RH BLD: NEGATIVE
RPR SER QL: NON REACTIVE
TEST INFORMATION: NORMAL
WBC # BLD AUTO: 7.5 X10E3/UL (ref 3.4–10.8)

## 2022-07-06 ENCOUNTER — ULTRASOUND (OUTPATIENT)
Dept: PERINATAL CARE | Facility: OTHER | Age: 27
End: 2022-07-06
Payer: COMMERCIAL

## 2022-07-06 ENCOUNTER — ROUTINE PRENATAL (OUTPATIENT)
Dept: OBGYN CLINIC | Facility: CLINIC | Age: 27
End: 2022-07-06
Payer: COMMERCIAL

## 2022-07-06 VITALS
SYSTOLIC BLOOD PRESSURE: 119 MMHG | BODY MASS INDEX: 32.53 KG/M2 | HEIGHT: 63 IN | WEIGHT: 183.6 LBS | DIASTOLIC BLOOD PRESSURE: 65 MMHG

## 2022-07-06 VITALS
HEIGHT: 63 IN | DIASTOLIC BLOOD PRESSURE: 66 MMHG | SYSTOLIC BLOOD PRESSURE: 95 MMHG | WEIGHT: 184.8 LBS | BODY MASS INDEX: 32.74 KG/M2 | HEART RATE: 76 BPM

## 2022-07-06 DIAGNOSIS — Z23 NEED FOR DIPHTHERIA-TETANUS-PERTUSSIS (TDAP) VACCINE: ICD-10-CM

## 2022-07-06 DIAGNOSIS — Z36.89 ENCOUNTER FOR ULTRASOUND TO ASSESS FETAL GROWTH: ICD-10-CM

## 2022-07-06 DIAGNOSIS — Z3A.28 28 WEEKS GESTATION OF PREGNANCY: Primary | ICD-10-CM

## 2022-07-06 DIAGNOSIS — Z3A.28 28 WEEKS GESTATION OF PREGNANCY: ICD-10-CM

## 2022-07-06 DIAGNOSIS — R76.8 HEPATITIS C ANTIBODY POSITIVE IN BLOOD: ICD-10-CM

## 2022-07-06 DIAGNOSIS — O98.519 COVID-19 AFFECTING PREGNANCY, ANTEPARTUM: Primary | ICD-10-CM

## 2022-07-06 DIAGNOSIS — O98.412 CHRONIC VIRAL HEPATITIS COMPLICATING PREGNANCY IN SECOND TRIMESTER (HCC): ICD-10-CM

## 2022-07-06 DIAGNOSIS — B18.9 CHRONIC VIRAL HEPATITIS COMPLICATING PREGNANCY IN SECOND TRIMESTER (HCC): ICD-10-CM

## 2022-07-06 DIAGNOSIS — Z36.2 ENCOUNTER FOR FOLLOW-UP ULTRASOUND OF FETAL ANATOMY: ICD-10-CM

## 2022-07-06 DIAGNOSIS — Z67.91 RH NEGATIVE STATE IN ANTEPARTUM PERIOD: ICD-10-CM

## 2022-07-06 DIAGNOSIS — O34.211 MATERNAL CARE DUE TO LOW TRANSVERSE UTERINE SCAR FROM PREVIOUS CESAREAN DELIVERY: ICD-10-CM

## 2022-07-06 DIAGNOSIS — O26.899 RH NEGATIVE STATE IN ANTEPARTUM PERIOD: ICD-10-CM

## 2022-07-06 DIAGNOSIS — U07.1 COVID-19 AFFECTING PREGNANCY, ANTEPARTUM: Primary | ICD-10-CM

## 2022-07-06 PROBLEM — O99.331: Status: RESOLVED | Noted: 2022-03-18 | Resolved: 2022-07-06

## 2022-07-06 LAB
SL AMB  POCT GLUCOSE, UA: NEGATIVE
SL AMB POCT URINE PROTEIN: POSITIVE

## 2022-07-06 PROCEDURE — 90472 IMMUNIZATION ADMIN EACH ADD: CPT

## 2022-07-06 PROCEDURE — 90471 IMMUNIZATION ADMIN: CPT

## 2022-07-06 PROCEDURE — 90715 TDAP VACCINE 7 YRS/> IM: CPT

## 2022-07-06 PROCEDURE — 99213 OFFICE O/P EST LOW 20 MIN: CPT | Performed by: NURSE PRACTITIONER

## 2022-07-06 PROCEDURE — 76816 OB US FOLLOW-UP PER FETUS: CPT | Performed by: OBSTETRICS & GYNECOLOGY

## 2022-07-06 PROCEDURE — 99213 OFFICE O/P EST LOW 20 MIN: CPT | Performed by: STUDENT IN AN ORGANIZED HEALTH CARE EDUCATION/TRAINING PROGRAM

## 2022-07-06 NOTE — ASSESSMENT & PLAN NOTE
- PTL/PPROM/Bleeding precautions given  Kick counts reviewed  - Repeat US performed today with [facility] for missed anatomy  Formal report not yet available, but patient reports reassuring findings  Repeat US scheduled for 36 weeks, per [facility] recommendations  - Third trimester labs reviewed  Rhogam and TDaP administered today  - Reviewed plan for collection of GBS swab at 36 weeks  - Problem list updated, results console reviewed and updated with pertinent prenatal labs    - PMH, PSH, medications reviewed and updated as needed  - Return to office in 2 wk(s) for routine prenatal care

## 2022-07-06 NOTE — PROGRESS NOTES
Routine Prenatal Visit  [address], [address], [facility], [address] [facility]    Assessment/Plan:  Remigio Lutz is a 27y o  year old  at 28w5d who presents for routine prenatal visit  1  28 weeks gestation of pregnancy  Assessment & Plan:  - PTL/PPROM/Bleeding precautions given  Kick counts reviewed  - Repeat US performed today with [facility] for missed anatomy  Formal report not yet available, but patient reports reassuring findings  Repeat US scheduled for 36 weeks, per [facility] recommendations  - Third trimester labs reviewed  Rhogam and TDaP administered today  - Reviewed plan for collection of GBS swab at 36 weeks  - Problem list updated, results console reviewed and updated with pertinent prenatal labs  - PMH, PSH, medications reviewed and updated as needed  - Return to office in 2 wk(s) for routine prenatal care    Orders:  -     POCT urine dip    2  Need for diphtheria-tetanus-pertussis (Tdap) vaccine  -     TDAP VACCINE GREATER THAN OR EQUAL TO 8YO IM    3  Hepatitis C antibody positive in blood    4  Maternal care due to low transverse uterine scar from previous  delivery  Assessment & Plan:  - Plans TOLAC      5  Rh negative state in antepartum period  Assessment & Plan:  - Rhogam administered today    Orders:  -     Rho(D) immune globulin (RHOGAM ULTRA-FILTERED PLUS) IM injection 300 mcg        Subjective:   Marline Crump is a 27 y o  [id number] who presents for routine prenatal care at 28w5d  Complaints today: No  LOF: No; VB: no; Contractions: No; FM: Present    Objective:  /65 (BP Location: Left arm, Patient Position: Sitting, Cuff Size: Standard)   Ht 5' 3" (1 6 m)   Wt 83 3 kg (183 lb 9 6 oz)   LMP 2021 (Exact Date)   BMI 32 52 kg/m²     General: Well appearing, no distress  Respiratory: Unlabored breathing  Cardiovascular: Regular rate  Abdomen: Soft, gravid, nontender  Extremities: Warm and well perfused  Non tender      Pregravid Weight/BMI: 73 9 kg (163 lb) (BMI 28 88)  Current Weight: 83 3 kg (183 lb 9 6 oz)   Total Weight Gain: 9 344 kg (20 lb 9 6 oz)     Pre-Jasper Vitals    Flowsheet Row Most Recent Value   Prenatal Assessment    Fetal Heart Rate 140   Fundal Height (cm) 28 cm   Movement Present   Prenatal Vitals    Blood Pressure 119/65   Weight - Scale 83 3 kg (183 lb 9 6 oz)   Urine Albumin/Glucose    Dilation/Effacement/Station    Vaginal Drainage    Draining Fluid No   Edema    LLE Edema None   RLE Edema None   Facial Edema None           Elsworth Curling, MD  2022 2:19 PM

## 2022-07-06 NOTE — PATIENT INSTRUCTIONS
Thank you for choosing us for your  care today  If you have any questions about your ultrasound or care, please do not hesitate to contact us or your primary obstetrician  Some general instructions for your pregnancy are:    Protect against coronavirus: get vaccinated - pregnant women are increased risk of severe COVID  Notify your primary care doctor if you have any symptoms  Exercise: Aim for 22 minutes per day (150 minutes per week) of regular exercise  Walking is great! Nutrition: aim for calcium-rich and iron-rich foods as well as healthy sources of protein  Learn about Preeclampsia: preeclampsia is a common, serious high blood pressure complication in pregnancy  A blood pressure of 186DVVU (systolic or top number) or 05YCIO (diastolic or bottom number) is not normal and needs evaluation by your doctor  Aspirin is sometimes prescribed in early pregnancy to prevent preeclampsia in women with risk factors - ask your obstetrician if you should be on this medication  If you smoke, try to reduce how many cigarettes you smoke or try to quit completely  Do not vape  Other warning signs to watch out for in pregnancy or postpartum: chest pain, obstructed breathing or shortness of breath, seizures, thoughts of hurting yourself or your baby, bleeding, a painful or swollen leg, fever, or headache (see [facility] POST-BIRTH Warning Signs campaign)  If these happen call 911  Itching is also not normal in pregnancy and if you experience this, especially over your hands and feet, potentially worse at night, notify your doctors

## 2022-07-06 NOTE — LETTER
2022     Abby [address], [address]  [place]    Patient: Martita Mackay   YOB: 1995   Date of Visit: 2022       Dear Dr William Geiger: Thank you for referring Cecille Grider to me for evaluation  Below are my notes for this consultation  If you have questions, please do not hesitate to call me  I look forward to following your patient along with you  Sincerely,        Carl Teran MD        CC: No Recipients  Carl Teran MD  2022  1:09 AM  Sign when Signing Visit  Ms Umang Hayden has no complaints today  She reports regular fetal movements and does not report any problems  She is here today at 28w5d for an ultrasound for fetal growth and missed anatomy  Problem list:  1  Chronic hepatitis C  2  History of  section  3  COVID 19 in pregnancy   4  Former smoker     Ultrasound findings: The ultrasound today shows normal interval fetal growth and fluid  Growth is in the 45th percentile  Anterior placenta noted  Pregnancy ultrasound has limitations and is unable to detect all forms of fetal congenital abnormalities  The inaccuracy in the EFW can be off by 1 lb either way in the third trimester  Specific counseling was provided on the following problems:  1  She was commended for her smoking cessation  2  Her normal 1 hour glucola screen of 123 was reviewed   3  F/U growth scan due to history of COVID 19 infection in pregnancy     Follow up recommended: growth and missed anatomy scan @ 36 weeks scheduled today  Pre visit time reviewing her records 5 minutes  Face to face time 5 minutes  Post visit time on documentation of note, updating her problem list, adding orders and prescriptions 5 minutes  Procedures that were completed today were charged separately  The level of decision making was low complexity     Michelle JUÁREZ    I supervised the Advanced Practitioner    I discussed the case with the Advanced Practitioner and reviewed the AP note, ultrasound pictures, prescribed medications, and orders placed       Amee Allen MD

## 2022-07-08 NOTE — PROGRESS NOTES
Ms Anotnia Bowens has no complaints today  She reports regular fetal movements and does not report any problems  She is here today at 28w5d for an ultrasound for fetal growth and missed anatomy  Problem list:  1  Chronic hepatitis C  2  History of  section  3  COVID 19 in pregnancy   4  Former smoker     Ultrasound findings: The ultrasound today shows normal interval fetal growth and fluid  Growth is in the 45th percentile  Anterior placenta noted  Pregnancy ultrasound has limitations and is unable to detect all forms of fetal congenital abnormalities  The inaccuracy in the EFW can be off by 1 lb either way in the third trimester  Specific counseling was provided on the following problems:  1  She was commended for her smoking cessation  2  Her normal 1 hour glucola screen of 123 was reviewed   3  F/U growth scan due to history of COVID 19 infection in pregnancy     Follow up recommended: growth and missed anatomy scan @ 36 weeks scheduled today  Pre visit time reviewing her records 5 minutes  Face to face time 5 minutes  Post visit time on documentation of note, updating her problem list, adding orders and prescriptions 5 minutes  Procedures that were completed today were charged separately  The level of decision making was low complexity     Mary JUÁREZ    I supervised the Advanced Practitioner  I discussed the case with the Advanced Practitioner and reviewed the AP note, ultrasound pictures, prescribed medications, and orders placed       Dontae Monteiro MD

## 2022-07-18 ENCOUNTER — TELEPHONE (OUTPATIENT)
Dept: OBGYN CLINIC | Facility: CLINIC | Age: 27
End: 2022-07-18

## 2022-07-18 NOTE — TELEPHONE ENCOUNTER
Edel Shea called into the nurses line and left a message regarding cramping, and asked for a call back  Call placed back to Edel Monse  Edel Shea had some cramping early this morning between 3:00-3:50  Her lower abdominal/pelvic area was experiencing some tightening and cramping  Cramping tapered off after that 50 minute episode  Through out this morning riley is feeling occasional cramping here and there  She was on her feet a lot on Saturday, and was able to rest a little more yesterday  She is drinking around 60 oz of water  Annamarie Medina to continue to monitor the cramping they are most likely Erskin Ludowici which are common in pregnancy  Also advised her to increase her water intake to be closer to  oz of water with the hot weather, as well as relaxing and putting her feet up  Advised if the cramping becomes more consistent, or who whole abdomen is becoming tense to contact the office back  She denies LOF, and bleeding  Increase in discharge per pt, and + FM

## 2022-07-19 ENCOUNTER — ROUTINE PRENATAL (OUTPATIENT)
Dept: OBGYN CLINIC | Facility: CLINIC | Age: 27
End: 2022-07-19

## 2022-07-19 VITALS
DIASTOLIC BLOOD PRESSURE: 62 MMHG | SYSTOLIC BLOOD PRESSURE: 90 MMHG | WEIGHT: 184.2 LBS | HEIGHT: 63 IN | BODY MASS INDEX: 32.64 KG/M2

## 2022-07-19 DIAGNOSIS — O34.211 MATERNAL CARE DUE TO LOW TRANSVERSE UTERINE SCAR FROM PREVIOUS CESAREAN DELIVERY: Primary | ICD-10-CM

## 2022-07-19 DIAGNOSIS — Z3A.30 30 WEEKS GESTATION OF PREGNANCY: ICD-10-CM

## 2022-07-19 DIAGNOSIS — Z67.91 RH NEGATIVE STATE IN ANTEPARTUM PERIOD: ICD-10-CM

## 2022-07-19 DIAGNOSIS — O98.519 COVID-19 AFFECTING PREGNANCY, ANTEPARTUM: ICD-10-CM

## 2022-07-19 DIAGNOSIS — U07.1 COVID-19 AFFECTING PREGNANCY, ANTEPARTUM: ICD-10-CM

## 2022-07-19 DIAGNOSIS — R76.8 HEPATITIS C ANTIBODY POSITIVE IN BLOOD: ICD-10-CM

## 2022-07-19 DIAGNOSIS — O26.899 RH NEGATIVE STATE IN ANTEPARTUM PERIOD: ICD-10-CM

## 2022-07-19 PROBLEM — O02.1 MISSED ABORTION WITH FETAL DEMISE BEFORE 20 COMPLETED WEEKS OF GESTATION: Status: RESOLVED | Noted: 2021-04-21 | Resolved: 2022-07-19

## 2022-07-19 LAB
SL AMB  POCT GLUCOSE, UA: NEGATIVE
SL AMB POCT URINE PROTEIN: NEGATIVE

## 2022-07-19 NOTE — PROGRESS NOTES
Routine Prenatal Visit  [address], [facility], [address] [facility]    Assessment/Plan:  José Miguel Mitchell is a 32y o  year old  at 30w4d who presents for routine prenatal visit  1  Maternal care due to low transverse uterine scar from previous  delivery  Assessment & Plan:  Plans TOLAC      2  Rh negative state in antepartum period    3  Hepatitis C antibody positive in blood    4  COVID-19 affecting pregnancy, antepartum  Assessment & Plan:  Growth U/S scheduled @ 36 weeks      5  30 weeks gestation of pregnancy  -     POCT urine dip        Subjective:     CC: Prenatal care    Adam Esparza is a 32 y o   female who presents for routine prenatal care at 30w4d  Pregnancy ROS: no leakage of fluid, pelvic pain, or vaginal bleeding  normal fetal movement  The following portions of the patient's history were reviewed and updated as appropriate: allergies, current medications, past family history, past medical history, obstetric history, gynecologic history, past social history, past surgical history and problem list       Objective:  BP 90/62   Ht 5' 3" (1 6 m)   Wt 83 6 kg (184 lb 3 2 oz)   LMP 2021 (Exact Date)   BMI 32 63 kg/m²   Pregravid Weight/BMI: 73 9 kg (163 lb) (BMI 28 88)  Current Weight: 83 6 kg (184 lb 3 2 oz)   Total Weight Gain: 9 616 kg (21 lb 3 2 oz)   Pre-Jasper Vitals    Flowsheet Row Most Recent Value   Prenatal Assessment    Fetal Heart Rate 145   Fundal Height (cm) 30 cm   Movement Present   Prenatal Vitals    Blood Pressure 90/62   Weight - Scale 83 6 kg (184 lb 3 2 oz)   Urine Albumin/Glucose    Dilation/Effacement/Station    Vaginal Drainage    Edema    LLE Edema Trace   RLE Edema Trace   Facial Edema None           General: Well appearing, no distress  Respiratory: Unlabored breathing  Cardiovascular: Regular rate  Abdomen: Soft, gravid, nontender  Fundal Height: Appropriate for gestational age    Extremities: Warm and well perfused  Non tender

## 2022-08-04 ENCOUNTER — ROUTINE PRENATAL (OUTPATIENT)
Dept: OBGYN CLINIC | Facility: CLINIC | Age: 27
End: 2022-08-04

## 2022-08-04 ENCOUNTER — TELEPHONE (OUTPATIENT)
Dept: OBGYN CLINIC | Facility: CLINIC | Age: 27
End: 2022-08-04

## 2022-08-04 VITALS
BODY MASS INDEX: 32.85 KG/M2 | SYSTOLIC BLOOD PRESSURE: 96 MMHG | WEIGHT: 185.4 LBS | DIASTOLIC BLOOD PRESSURE: 58 MMHG | HEIGHT: 63 IN

## 2022-08-04 DIAGNOSIS — Z3A.32 32 WEEKS GESTATION OF PREGNANCY: Primary | ICD-10-CM

## 2022-08-04 DIAGNOSIS — Z34.93 PRENATAL CARE IN THIRD TRIMESTER: ICD-10-CM

## 2022-08-04 DIAGNOSIS — F19.91 HISTORY OF DRUG USE: ICD-10-CM

## 2022-08-04 DIAGNOSIS — O34.211 MATERNAL CARE DUE TO LOW TRANSVERSE UTERINE SCAR FROM PREVIOUS CESAREAN DELIVERY: ICD-10-CM

## 2022-08-04 LAB
SL AMB  POCT GLUCOSE, UA: NORMAL
SL AMB POCT URINE PROTEIN: NORMAL

## 2022-08-04 NOTE — PROGRESS NOTES
Routine Prenatal Visit  [address], [address], [facility], [address] [facility]    Assessment/Plan:  Ish Childress is a 32y o  year old  at [address] [place] [address] who presents for routine prenatal visit  1  32 weeks gestation of pregnancy  -     POCT urine dip    2  Prenatal care in third trimester    3  Maternal care due to low transverse uterine scar from previous  delivery    4  History of drug use        Next OB Visit 2 weeks  Subjective:     CC: Prenatal care    Roselia Frank is a 32 y o   female who presents for routine prenatal care at [address] [place] [address]  Pregnancy ROS: no leakage of fluid, pelvic pain, or vaginal bleeding  normal fetal movement  The following portions of the patient's history were reviewed and updated as appropriate: allergies, current medications, past family history, past medical history, obstetric history, gynecologic history, past social history, past surgical history and problem list       Objective:  BP 96/58 (BP Location: Left arm, Patient Position: Sitting, Cuff Size: Standard)   Ht 5' 3" (1 6 m)   Wt 84 1 kg (185 lb 6 4 oz)   LMP 2021 (Exact Date)   BMI 32 84 kg/m²   Pregravid Weight/BMI: 73 9 kg (163 lb) (BMI 28 88)  Current Weight: 84 1 kg (185 lb 6 4 oz)   Total Weight Gain: 10 2 kg (22 lb 6 4 oz)   Pre- Vitals    Flowsheet Row Most Recent Value   Prenatal Assessment    Fetal Heart Rate 140   Fundal Height (cm) 33 cm   Movement Present   Prenatal Vitals    Blood Pressure 96/58   Weight - Scale 84 1 kg (185 lb 6 4 oz)   Urine Albumin/Glucose    Dilation/Effacement/Station    Vaginal Drainage    Draining Fluid No   Edema            General: Well appearing, no distress  Abdomen: Soft, gravid, nontender  Fundal Height: Appropriate for gestational age  Extremities: Warm and well perfused  Non tender

## 2022-08-19 ENCOUNTER — TELEPHONE (OUTPATIENT)
Dept: OBGYN CLINIC | Facility: CLINIC | Age: 27
End: 2022-08-19

## 2022-08-19 NOTE — TELEPHONE ENCOUNTER
Spoke with Sea, who is a PA from [facility]  Yolis Best showed up to [facility] in a manic episode  She has a hx of Bipolar Disorder but has not been on medication for years because it has been under control  She is currently 35 weeks pregnant  She informed the PA that her episode is related to not sleeping more than 2 hour a night, not eating well, and labile modes  Sea the PA from [facility] felt she is in need of medication, not in-patient  Sea wanted to know if our doctor's are comfortable prescribing and managing medications  Placed [place] on hold and spoke with Dr Branden Darden regarding this  Dr Branden Darden said they are only comfortable prescribing anxiety medications like SSRI's  Anything other then that needs to come from a specialist like a psychiatrist  Cameron Davenport from being on hold and informed her of Dr Jodi Arana information  We will only prescribe SSRI anxiety medication nothing else  Sea was wondering if we knew of anywhere to help her get what she needs and I informed her we would recommend [facility] however they are not taking new patients from what we have been told  Sea said she would speak with the patient and go from there  Informed stephanie she does have an apt here in our office on Tuesday 8/23/2022 where we can follow up on her visit

## 2022-08-23 ENCOUNTER — ROUTINE PRENATAL (OUTPATIENT)
Dept: OBGYN CLINIC | Facility: CLINIC | Age: 27
End: 2022-08-23

## 2022-08-23 VITALS
SYSTOLIC BLOOD PRESSURE: 101 MMHG | WEIGHT: 193 LBS | DIASTOLIC BLOOD PRESSURE: 61 MMHG | HEIGHT: 63 IN | BODY MASS INDEX: 34.2 KG/M2

## 2022-08-23 DIAGNOSIS — O26.899 RH NEGATIVE STATE IN ANTEPARTUM PERIOD: ICD-10-CM

## 2022-08-23 DIAGNOSIS — Z67.91 RH NEGATIVE STATE IN ANTEPARTUM PERIOD: ICD-10-CM

## 2022-08-23 DIAGNOSIS — F31.70 BIPOLAR AFFECTIVE DISORDER IN REMISSION (HCC): ICD-10-CM

## 2022-08-23 DIAGNOSIS — F41.0 PANIC ATTACKS: ICD-10-CM

## 2022-08-23 DIAGNOSIS — O34.211 MATERNAL CARE DUE TO LOW TRANSVERSE UTERINE SCAR FROM PREVIOUS CESAREAN DELIVERY: ICD-10-CM

## 2022-08-23 DIAGNOSIS — F41.9 ANXIETY: ICD-10-CM

## 2022-08-23 DIAGNOSIS — U07.1 COVID-19 AFFECTING PREGNANCY, ANTEPARTUM: ICD-10-CM

## 2022-08-23 DIAGNOSIS — O98.519 COVID-19 AFFECTING PREGNANCY, ANTEPARTUM: ICD-10-CM

## 2022-08-23 DIAGNOSIS — Z3A.35 35 WEEKS GESTATION OF PREGNANCY: Primary | ICD-10-CM

## 2022-08-23 LAB
SL AMB  POCT GLUCOSE, UA: NEGATIVE
SL AMB POCT URINE PROTEIN: NORMAL

## 2022-08-23 NOTE — PROGRESS NOTES
Routine Prenatal Visit  [address], [address], [facility], [address] [facility]    Assessment/Plan:  Sade Paredes is a 32y o  year old  at 29w2d who presents for routine prenatal visit  1  Encounter for  screening for Streptococcus B    2  35 weeks gestation of pregnancy  -     POCT urine dip    3  Rh negative state in antepartum period    4  Bipolar affective disorder in remission ([address] )    5  Panic attacks    Pt seen by crisis  Anxiety,  Sadness  And  Anger  Katya Harsh to   Fu with OB  No feelings of hurting self or others  + fm No UTCX    present  Previous use of  Abilify, Prozac and Ramah pro  Afraid of  Coming weeks and changes!     Dw pt  Baby and Me for  Counseling service to begin with  If desires  Further treatment contact office tc medications  Support system present    Subjective:     CC: Prenatal care    Medhat Cisneros is a 32 y o   female who presents for routine prenatal care at 35w4d  Pregnancy ROS:  No leakage  of fluid, pelvic pain, or vaginal bleeding   + fetal movement      The following portions of the patient's history were reviewed and updated as appropriate: allergies, current medications, past family history, past medical history, obstetric history, gynecologic history, past social history, past surgical history and problem list       Objective:  /61 (BP Location: Left arm, Patient Position: Sitting, Cuff Size: Standard)   Ht 5' 3" (1 6 m)   Wt 87 5 kg (193 lb)   LMP 2021 (Exact Date)   BMI 34 19 kg/m²   Pregravid Weight/BMI: 73 9 kg (163 lb) (BMI 28 88)  Current Weight: 87 5 kg (193 lb)   Total Weight Gain: 13 6 kg (30 lb)   Pre- Vitals    Flowsheet Row Most Recent Value   Prenatal Assessment    Fetal Heart Rate 138-144   Fundal Height (cm) 36 cm   Movement Present   Prenatal Vitals    Blood Pressure 101/61   Weight - Scale 87 5 kg (193 lb)   Urine Albumin/Glucose    Dilation/Effacement/Station    Vaginal Drainage    Draining Fluid No   Edema    LLE Edema Trace   RLE Edema Trace   Facial Edema None           General: Well appearing, no distress  Respiratory: Unlabored breathing  Cardiovascular: Regular rate  Abdomen: Soft, gravid, nontender  Fundal Height: Appropriate for gestational age  Extremities: Warm and well perfused  Non tender

## 2022-08-23 NOTE — PATIENT INSTRUCTIONS
The Third Trimester  (28-42 weeks)  YOUR BABY   * your baby sucks its thumb now! * your baby can hear voices and respond to touch   so talk to him or her!!   * your babys brain grows and develops most in the last 2 months of pregnancy   * babys head and bones are soft and flexible so they can fit through the birth canal   * babys movements change towards the end of pregnancy because there is less room for kicking and stretching in your belly   * babys lungs are not fully developed and completely ready to breathe on their own until the last 3-4 weeks before your due date    YOUR BODY   * your belly is growing a lot now   * it may become more difficult to sleep well at night or to be as active as you usually are   * you may sweat more than usual   * you will become more off-balancebe careful not to fall!!   * you may develop hemorrhoids (which can be painful and make it difficult to sit down)   * the last two months of pregnancy can become very uncomfortablewith backaches, headaches, and heartburn   * you can start to have contractions  as long as they are irregular and less than 5 per hour, this is a normal part of your body getting ready to have a baby   * your cervix may start to thin out and open upto get ready for delivery   * you may find yourself needing to pee very often  because baby is pressing on your bladder so much   * you may get out of breathe more quickly than usual      FETAL KICK COUNTS    In the third trimester (after 28 weeks gestation) you should be performing fetal kick counts every day  Your baby should move at least 10 times in 2 hours during an active time, once a day  Choose atime of day when your baby is most active  Try to do this around the same time each day  Get into a comfortable position and then write down the time your baby first moves  Count each movement until the baby moves 10 times  These movements include kicks, punches, nudges, flutters, or rolls    This can take anywhere from 5 minutes to 2 hours  Write down the time you feel the baby's 10th movement  If 2 hours has passed and your baby has not moved at least 10 times, you should CALL THE OFFICE RIGHT AWAY  [phone]          PREMATURE LABOR     When to call [phone]:  * I need to call immediately if I have even a small amount of LIQUID leaking from my vagina, with or without contractions  * I need to call if I am BLEEDING from my vagina  * I need to call if I am feeling CRAMPING that continues after drinking 2-3 glasses of water and lying down on my side for one hour and that feels like I am having a period  * I need to call if I feel CONTRACTIONS  more than 4 times in an hour that feels like the baby is balling up even after I try drinking 2-3 glasses of water and lying down on my side for an hour  * I need to call if I notice a change in my vaginal DISCHARGE  * I need to call if I am feeling PELVIC PRESSURE  that feels like the baby is pushing down into my vagina and lasts more than an hour  * I need to call if I have LOW BACKACHE which is new and near my tailbone  It may either come and go several times during an hour or stay there constantly  PRE-ECLAMPSIA     What is it? Pre-eclampsia is a serious disease that can occur during pregnancy related to high blood pressures  It can happen to any woman  Why should I care? Women who develop pre-eclampsia have serious risks which can include seizures, stroke, organ damage, premature birth of their baby  In the very worst cases, it can cause death of the mother and/or their baby  What should I pay attention to?    Signs and symptoms of pre-eclampsia can include:   * Severe swelling of face or hands    * A headache that will not go away even after you have taken Tylenol   * Seeing spots or changes in eyesight    * Pain in the upper abdomen or shoulder    * New nausea and vomiting (in the second half of pregnancy)    * Sudden weight gain    * Difficulty breathing     What should I do? If you experience any of the above symptoms of pre-eclampsia, contact your OB provider  Finding pre-eclampsia early is important for you and your baby  Call us       BREASTFEEDING     BENEFITS FOR BABIES   * stronger immune systems (less allergies, eczema, asthma, and childhood cancers)   * less diarrhea and constipation or other GI diseases   * fewer colds and ear infections   * better vision and teeth (fewer cavities)   * improves IQ   * lower rates of diabetes and obesity in childhood     BENEFITS FOR MOMS   * promotes faster weight loss after delivery   * lower risk for postpartum depression   * lower risk for breast, uterine, and ovarian cancers   * lower risk for osteoporosis developing with age   * easier than formula - is always right with you, clean, and the right temperature   * less expensive than formulaits FREE !!!!     KEYS TO SUCCESSFUL BREASTFEEDING   * keep baby skin-to-skin until after first feeding event   * keep baby in your room with you during your hospital stay after delivery   * avoid any bottle feedings (unless medically necessary)   * limit the use of pacifiers and swaddling   * ask for help if you are having any issueslactation consultants (who specialize in breastfeeding) are available to help you   * a healthy diet for momeating a variety of foods and portions in moderation    THINGS YOU SHOULD KNOW ABOUT BREASTFEEDING   * most medications are considered compatible with breastfeeding by the [address] [place] [address] Academy of Pediatrics, but you should check with your health care provider or lactation consultant prior to taking a new medicationjust to be sure it is safe   * alcohol (beer, wine, liquor) can be passed from mother to baby through breast milkan occasional, social drink is deemed acceptable by the [employer] of [address]   more than that should be avoided   * breastfeeding is NOT an effective method of birth control   * nicotine (in cigarettes) can pass from mother to baby through breast milk   however, for mothers who smoke, it is still healthier to breastfeed than use formula   * caffeine should be limited to 1-3 cups per dayincludes coffee, soda, energy drinks         PERINEAL / VAGINAL MASSAGE    What can I do now to decrease my chances of tearing during delivery? Massaging around the vaginal opening by you (or your partner), either antepartum (before birth) or during the second stage of labor, can reduce the likelihood of perineal tearing during childbirth  Likewise, the use of warm packs held on the perineum during the pushing stage of labor can reduce the severity of your tear  This will happen during the pushing stage of labor  At home, you can also help reduce the chances of injury that may occur during the birth of your child through perineal massage  When should I do this? Starting around or shortly after 34 weeks of pregnancy, you or your partner should provide 5-10 minutes of vaginal massage 1-4 times per week  How? Use either almond, coconut, or olive oil and water mixture on 1 or 2 fingers (depending on comfort)  Insert finger(s) 3-5cm into the vagina  Apply sweeping downward/sideward pressure from 3 to 9 o'clock for 5-10 minutes, 1-4 times per week  WARNING SIGNS DURING PREGNANCY  Call our office at [phone] if you experience any of the followin  Vaginal bleeding  2  Sharp abdominal pain that does not go away  3  Fever (more than 100 4 and is not relieved by Tylenol)  4  Persistent vomiting lasting greater than 24 hours  5  Chest pain   6  Pain or burning when you urinate  7  Severe headache that doesn't resolve with Tylenol  8  Blurred vision or seeing spots in your vision  9  Sudden swelling of your face or hands  10  Redness, swelling or pain in a leg  11  A sudden weight gain in just a few days  12   Decrease in your baby's movement (after 28 weeks or the 6th month of pregnancy)  13  A loss of watery fluid from your vagina - can be a gush, a trickle or continuous wetness  14  After 20 weeks of pregnancy, rhythmic cramping (greater than 4 per hour) or menstrual like low/pelvic pain          VACCINES IN PREGNANCY    TDAP  Whopping cough (or pertusSsis) can be serious for anyone, but for your , it can be life-threatning  Up to 20 babies die each year in the [place]  Due to whopping cough  About half of babies younger than 3year old who get whopping cough need treatment in the hospital   The younger the baby is when he or she gets whopping cough, the more likely he or she will need to be treated in a hospital   When you receive the whopping cough vaccine (Tdap) during your pregnancy, your body will create protective antibodies and pass some of them to your baby before birth  These antibodies can help protect your baby from getting whopping cough until they are old enough to be vaccinated themselves (usually around 7 months of age)  INFLUENZA  Changes in your immune, heart, and lung functions during pregnancy make you more likely to get seriously ill from the flu  Catching the flu also increases your chances for serious problems for your developing baby, including premature labor and delivery  It is recommended that all women who are pregnant during flu season should receive an influenza vaccine

## 2022-08-28 PROBLEM — B18.2: Status: ACTIVE | Noted: 2022-08-28

## 2022-08-28 PROBLEM — O98.413: Status: ACTIVE | Noted: 2022-08-28

## 2022-08-28 NOTE — PROGRESS NOTES
Please refer to the [facility] ultrasound report in Ob Procedures for additional information regarding today's visit

## 2022-08-29 ENCOUNTER — ULTRASOUND (OUTPATIENT)
Dept: PERINATAL CARE | Facility: OTHER | Age: 27
End: 2022-08-29
Payer: COMMERCIAL

## 2022-08-29 ENCOUNTER — ROUTINE PRENATAL (OUTPATIENT)
Dept: OBGYN CLINIC | Facility: CLINIC | Age: 27
End: 2022-08-29

## 2022-08-29 VITALS
WEIGHT: 195.8 LBS | DIASTOLIC BLOOD PRESSURE: 72 MMHG | HEART RATE: 82 BPM | BODY MASS INDEX: 34.69 KG/M2 | SYSTOLIC BLOOD PRESSURE: 112 MMHG | HEIGHT: 63 IN

## 2022-08-29 VITALS — SYSTOLIC BLOOD PRESSURE: 100 MMHG | BODY MASS INDEX: 34.79 KG/M2 | DIASTOLIC BLOOD PRESSURE: 58 MMHG | WEIGHT: 196.4 LBS

## 2022-08-29 DIAGNOSIS — O34.211 MATERNAL CARE DUE TO LOW TRANSVERSE UTERINE SCAR FROM PREVIOUS CESAREAN DELIVERY: ICD-10-CM

## 2022-08-29 DIAGNOSIS — Z67.91 RH NEGATIVE STATE IN ANTEPARTUM PERIOD: ICD-10-CM

## 2022-08-29 DIAGNOSIS — Z3A.36 36 WEEKS GESTATION OF PREGNANCY: Primary | ICD-10-CM

## 2022-08-29 DIAGNOSIS — Z36.89 ENCOUNTER FOR OTHER SPECIFIED ANTENATAL SCREENING: ICD-10-CM

## 2022-08-29 DIAGNOSIS — B18.2 CHRONIC HEPATITIS C AFFECTING ANTEPARTUM CARE OF MOTHER, THIRD TRIMESTER (HCC): ICD-10-CM

## 2022-08-29 DIAGNOSIS — Z36.4 ULTRASOUND FOR ANTENATAL SCREENING FOR FETAL GROWTH RESTRICTION: ICD-10-CM

## 2022-08-29 DIAGNOSIS — O26.899 RH NEGATIVE STATE IN ANTEPARTUM PERIOD: ICD-10-CM

## 2022-08-29 DIAGNOSIS — O98.413 CHRONIC HEPATITIS C AFFECTING ANTEPARTUM CARE OF MOTHER, THIRD TRIMESTER (HCC): ICD-10-CM

## 2022-08-29 LAB
SL AMB  POCT GLUCOSE, UA: NEGATIVE
SL AMB POCT URINE PROTEIN: NEGATIVE

## 2022-08-29 PROCEDURE — 76816 OB US FOLLOW-UP PER FETUS: CPT | Performed by: OBSTETRICS & GYNECOLOGY

## 2022-08-29 NOTE — LETTER
August 29, 2022     [address]  [address] [facility] [address]  [facility]    Patient: Eugenio Barclay   YOB: 1995   Date of Visit: 8/29/2022       Dear Dr Demetrius Nina: Thank you for referring Nakia Ferguson to me for evaluation  Below are my notes for this consultation  If you have questions, please do not hesitate to call me  I look forward to following your patient along with you  Sincerely,        Pascale Mathias MD        CC: No Recipients  Pascale Mathias MD  8/28/2022  1:38 PM  Sign when Signing Visit  Please refer to the [facility] ultrasound report in Ob Procedures for additional information regarding today's visit

## 2022-08-29 NOTE — PATIENT INSTRUCTIONS
Kick Counts in Pregnancy   WHAT YOU NEED TO KNOW:   Kick counts measure how much your baby is moving in your womb  A kick from your baby can be felt as a twist, turn, swish, roll, or jab  It is common to feel your baby kicking at 26 to 28 weeks of pregnancy  You may feel your baby kick as early as 20 weeks of pregnancy  You may want to start counting at 28 weeks  DISCHARGE INSTRUCTIONS:   Contact your doctor immediately if:   You feel a change in the number of kicks or movements of your baby  You feel fewer than 10 kicks within 2 hours  You have questions or concerns about your baby's movements  Why measure kick counts:  Your baby's movement may provide information about your baby's health  He or she may move less, or not at all, if there are problems  Your baby may move less if he or she is not getting enough oxygen or nutrition from the placenta  Do not smoke while you are pregnant  Smoking decreases the amount of oxygen that gets to your baby  Talk to your healthcare provider if you need help to quit smoking  Tell your healthcare provider as soon as you feel a change in your baby's movements  When to measure kick counts:   Measure kick counts at the same time every day  Measure kick counts when your baby is awake and most active  Your baby may be most active in the evening  How to measure kick counts:  Check that your baby is awake before you measure kick counts  You can wake up your baby by lightly pushing on your belly, walking, or drinking something cold  Your healthcare provider may tell you different ways to measure kick counts  You may be told to do the following:  Use a chart or clock to keep track of the time you start and finish counting  Sit in a chair or lie on your left side  Place your hands on the largest part of your belly  Count until you reach 10 kicks  Write down how much time it takes to count 10 kicks  It may take 30 minutes to 2 hours to count 10 kicks  It should not take more than 2 hours to count 10 kicks  Follow up with your doctor as directed:  Write down your questions so you remember to ask them during your visits  © Copyright [address] 2022 Information is for End User's use only and may not be sold, redistributed or otherwise used for commercial purposes  All illustrations and images included in CareNotes® are the copyrighted property of A D A M , Inc  or [facility] Rachel Guerra   The above information is an  only  It is not intended as medical advice for individual conditions or treatments  Talk to your doctor, nurse or pharmacist before following any medical regimen to see if it is safe and effective for you

## 2022-08-29 NOTE — PROGRESS NOTES
Routine Prenatal Visit  [address]   [address], [place], [address]    Assessment/Plan:  Tri Tong is a 32y o  year old  at 36w3d who presents for routine prenatal visit  1  36 weeks gestation of pregnancy  Assessment & Plan:  - Labor/Bleeding/ROM precautions given  Kick counts reviewed  - GBS swab collected today  - Delivery consent reviewed and signed today  Risks of delivery reviewed, including bleeding, infection, damage to surrounding organs/structures (specifically bowel, bladder, vessels, nerves, ureters), need for operative vaginal delivery or  delivery, hysterectomy, need for blood transfusion, need for further surgery  - Problem list updated, results console reviewed and updated with pertinent prenatal labs  - PMH, PSH, medications reviewed and updated as needed  - Return to office in 1 wk(s) for routine prenatal care      Orders:  -     POCT urine dip    2  Encounter for other specified  screening  -     Strep B DNA probe, amplification    3  Maternal care due to low transverse uterine scar from previous  delivery  Assessment & Plan:  - Plans TOLAC      4  Rh negative state in antepartum period        Subjective:   Haleigh Becker is a 32 y o   who presents for routine prenatal care at 36w3d  Complaints today: No  LOF: No; VB: No; Contractions: No; FM: Present and normal    Objective:  /58   Wt 89 1 kg (196 lb 6 4 oz)   LMP 2021 (Exact Date)   BMI 34 79 kg/m²     General: Well appearing, no distress  Respiratory: Unlabored breathing  Cardiovascular: Regular rate  Abdomen: Soft, gravid, nontender  Extremities: Warm and well perfused  Non tender      Pregravid Weight/BMI: 73 9 kg (163 lb) (BMI 28 88)  Current Weight: 89 1 kg (196 lb 6 4 oz)   Total Weight Gain: 15 2 kg (33 lb 6 4 oz)     Pre-Jasper Vitals    Flowsheet Row Most Recent Value   Prenatal Assessment    Fetal Heart Rate 125   Fundal Height (cm) 36 cm Movement Present   Presentation Vertex   Prenatal Vitals    Blood Pressure 100/58   Weight - Scale 89 1 kg (196 lb 6 4 oz)   Urine Albumin/Glucose    Dilation/Effacement/Station    Cervical Dilation 0   Cervical Effacement 0   Fetal Station -4   Vaginal Drainage    Draining Fluid No   Edema    LLE Edema Trace   RLE Edema Trace   Facial Edema None           Charmain Lundborg, MD  8/29/2022 3:30 PM

## 2022-08-31 LAB — GP B STREP DNA SPEC QL NAA+PROBE: NEGATIVE

## 2022-09-09 ENCOUNTER — ROUTINE PRENATAL (OUTPATIENT)
Dept: OBGYN CLINIC | Facility: CLINIC | Age: 27
End: 2022-09-09

## 2022-09-09 VITALS
BODY MASS INDEX: 35.05 KG/M2 | WEIGHT: 197.8 LBS | HEIGHT: 63 IN | DIASTOLIC BLOOD PRESSURE: 74 MMHG | SYSTOLIC BLOOD PRESSURE: 114 MMHG

## 2022-09-09 DIAGNOSIS — O26.899 RH NEGATIVE STATE IN ANTEPARTUM PERIOD: ICD-10-CM

## 2022-09-09 DIAGNOSIS — F31.0 BIPOLAR AFFECTIVE DISORDER, CURRENT EPISODE HYPOMANIC (HCC): ICD-10-CM

## 2022-09-09 DIAGNOSIS — Z3A.38 38 WEEKS GESTATION OF PREGNANCY: Primary | ICD-10-CM

## 2022-09-09 DIAGNOSIS — R76.8 HEPATITIS C ANTIBODY POSITIVE IN BLOOD: ICD-10-CM

## 2022-09-09 DIAGNOSIS — Z67.91 RH NEGATIVE STATE IN ANTEPARTUM PERIOD: ICD-10-CM

## 2022-09-09 DIAGNOSIS — O34.211 MATERNAL CARE DUE TO LOW TRANSVERSE UTERINE SCAR FROM PREVIOUS CESAREAN DELIVERY: ICD-10-CM

## 2022-09-09 DIAGNOSIS — F19.91 HISTORY OF DRUG USE: ICD-10-CM

## 2022-09-09 LAB
SL AMB  POCT GLUCOSE, UA: ABNORMAL
SL AMB POCT URINE PROTEIN: ABNORMAL

## 2022-09-09 NOTE — PROGRESS NOTES
Routine Prenatal Visit  [address], [address], [facility], [address]    Assessment/Plan:  Edel Shea is a 32y o  year old  at 38w0d who presents for routine prenatal visit  1  38 weeks gestation of pregnancy  Assessment & Plan:  - Labor/Bleeding/ROM precautions given  Kick counts reviewed  - GBS negative result reviewed  - Problem list updated, results console reviewed and updated with pertinent prenatal labs  - PMH, PSH, medications reviewed and updated as needed  - Return to office in 1 wk(s) for routine prenatal care      Orders:  -     POCT urine dip    2  Maternal care due to low transverse uterine scar from previous  delivery  Assessment & Plan:  - Plans TOLAC  Will expectantly manage for now  3  Rh negative state in antepartum period    4  History of drug use    5  Bipolar affective disorder, current episode hypomanic ([facility] [address] )    6  Hepatitis C antibody positive in blood          Subjective:   Martinez Hopper is a 32 y o   who presents for routine prenatal care at 38w0d  Complaints today: No  LOF: No; VB: No; Contractions: No; FM: Present and normal    Objective:  /74 (BP Location: Left arm, Patient Position: Sitting, Cuff Size: Standard)   Ht 5' 3" (1 6 m)   Wt 89 7 kg (197 lb 12 8 oz)   LMP 2021 (Exact Date)   BMI 35 04 kg/m²     General: Well appearing, no distress  Respiratory: Unlabored breathing  Cardiovascular: Regular rate  Abdomen: Soft, gravid, nontender  Extremities: Warm and well perfused  Non tender      Pregravid Weight/BMI: 73 9 kg (163 lb) (BMI 28 88)  Current Weight: 89 7 kg (197 lb 12 8 oz)   Total Weight Gain: 15 8 kg (34 lb 12 8 oz)     Pre-Jasper Vitals    Flowsheet Row Most Recent Value   Prenatal Assessment    Fetal Heart Rate 125   Fundal Height (cm) 38 cm   Movement Present   Presentation Vertex   Prenatal Vitals    Blood Pressure 114/74   Weight - Scale 89 7 kg (197 lb 12 8 oz)   Urine Albumin/Glucose    Dilation/Effacement/Station    Vaginal Drainage    Draining Fluid No   Edema    LLE Edema Trace   RLE Edema Trace   Facial Edema None           Shaunna Sena MD  9/9/2022 1:54 PM

## 2022-09-15 ENCOUNTER — ROUTINE PRENATAL (OUTPATIENT)
Dept: OBGYN CLINIC | Facility: CLINIC | Age: 27
End: 2022-09-15

## 2022-09-15 VITALS — SYSTOLIC BLOOD PRESSURE: 92 MMHG | BODY MASS INDEX: 34.97 KG/M2 | WEIGHT: 197.4 LBS | DIASTOLIC BLOOD PRESSURE: 64 MMHG

## 2022-09-15 DIAGNOSIS — O98.519 COVID-19 AFFECTING PREGNANCY, ANTEPARTUM: ICD-10-CM

## 2022-09-15 DIAGNOSIS — O34.211 MATERNAL CARE DUE TO LOW TRANSVERSE UTERINE SCAR FROM PREVIOUS CESAREAN DELIVERY: Primary | ICD-10-CM

## 2022-09-15 DIAGNOSIS — U07.1 COVID-19 AFFECTING PREGNANCY, ANTEPARTUM: ICD-10-CM

## 2022-09-15 DIAGNOSIS — B18.2 CHRONIC HEPATITIS C AFFECTING ANTEPARTUM CARE OF MOTHER, THIRD TRIMESTER (HCC): ICD-10-CM

## 2022-09-15 DIAGNOSIS — Z67.91 RH NEGATIVE STATE IN ANTEPARTUM PERIOD: ICD-10-CM

## 2022-09-15 DIAGNOSIS — O26.899 RH NEGATIVE STATE IN ANTEPARTUM PERIOD: ICD-10-CM

## 2022-09-15 DIAGNOSIS — Z3A.38 38 WEEKS GESTATION OF PREGNANCY: ICD-10-CM

## 2022-09-15 DIAGNOSIS — O98.413 CHRONIC HEPATITIS C AFFECTING ANTEPARTUM CARE OF MOTHER, THIRD TRIMESTER (HCC): ICD-10-CM

## 2022-09-15 LAB
SL AMB  POCT GLUCOSE, UA: NEGATIVE
SL AMB POCT URINE PROTEIN: NEGATIVE

## 2022-09-15 NOTE — PROGRESS NOTES
Routine Prenatal Visit  [address]   [address] [facility], [address]    Assessment/Plan:  Lindsay Chairez is a 32y o  year old  at 38w7d who presents for routine prenatal visit  1  Maternal care due to low transverse uterine scar from previous  delivery  Assessment & Plan:  Would like TOLAC  Will reexamine next week  If cervix is not favorable then she may want to schedule a C/S       2  Chronic hepatitis C affecting antepartum care of mother, third trimester ([facility] [address] )    3  38 weeks gestation of pregnancy  -     POCT urine dip    4  Rh negative state in antepartum period    5  COVID-19 affecting pregnancy, antepartum        Subjective:     CC: Prenatal care    Bell Lopez is a 32 y o   female who presents for routine prenatal care at 38w6d  Pregnancy ROS: no leakage of fluid, pelvic pain, or vaginal bleeding  normal fetal movement  The following portions of the patient's history were reviewed and updated as appropriate: allergies, current medications, past family history, past medical history, obstetric history, gynecologic history, past social history, past surgical history and problem list       Objective:  BP 92/64   Wt 89 5 kg (197 lb 6 4 oz)   LMP 2021 (Exact Date)   BMI 34 97 kg/m²   Pregravid Weight/BMI: 73 9 kg (163 lb) (BMI 28 88)  Current Weight: 89 5 kg (197 lb 6 4 oz)   Total Weight Gain: 15 6 kg (34 lb 6 4 oz)   Pre- Vitals    Flowsheet Row Most Recent Value   Prenatal Assessment    Fetal Heart Rate 130   Fundal Height (cm) 39 cm   Movement Present   Presentation Vertex   Prenatal Vitals    Blood Pressure 92/64   Weight - Scale 89 5 kg (197 lb 6 4 oz)   Urine Albumin/Glucose    Dilation/Effacement/Station    Cervical Dilation   5   Cervical Effacement 20   Fetal Station -3   Vaginal Drainage    Draining Fluid No   Edema            General: Well appearing, no distress  Respiratory: Unlabored breathing  Cardiovascular: Regular rate   Abdomen: Soft, gravid, nontender  Fundal Height: Appropriate for gestational age  Extremities: Warm and well perfused  Non tender

## 2022-09-15 NOTE — ASSESSMENT & PLAN NOTE
Would like TOLAC  Will reexamine next week  If cervix is not favorable then she may want to schedule a C/S

## 2022-09-21 ENCOUNTER — ROUTINE PRENATAL (OUTPATIENT)
Dept: OBGYN CLINIC | Facility: CLINIC | Age: 27
End: 2022-09-21

## 2022-09-21 VITALS
BODY MASS INDEX: 35.26 KG/M2 | SYSTOLIC BLOOD PRESSURE: 108 MMHG | HEIGHT: 63 IN | WEIGHT: 199 LBS | DIASTOLIC BLOOD PRESSURE: 72 MMHG

## 2022-09-21 DIAGNOSIS — Z3A.39 39 WEEKS GESTATION OF PREGNANCY: Primary | ICD-10-CM

## 2022-09-21 DIAGNOSIS — O26.899 RH NEGATIVE STATE IN ANTEPARTUM PERIOD: ICD-10-CM

## 2022-09-21 DIAGNOSIS — U07.1 COVID-19 AFFECTING PREGNANCY, ANTEPARTUM: ICD-10-CM

## 2022-09-21 DIAGNOSIS — R76.8 HEPATITIS C ANTIBODY POSITIVE IN BLOOD: ICD-10-CM

## 2022-09-21 DIAGNOSIS — O34.211 MATERNAL CARE DUE TO LOW TRANSVERSE UTERINE SCAR FROM PREVIOUS CESAREAN DELIVERY: ICD-10-CM

## 2022-09-21 DIAGNOSIS — F19.91 HISTORY OF DRUG USE: ICD-10-CM

## 2022-09-21 DIAGNOSIS — O98.519 COVID-19 AFFECTING PREGNANCY, ANTEPARTUM: ICD-10-CM

## 2022-09-21 DIAGNOSIS — F31.70 BIPOLAR AFFECTIVE DISORDER IN REMISSION (HCC): ICD-10-CM

## 2022-09-21 DIAGNOSIS — Z67.91 RH NEGATIVE STATE IN ANTEPARTUM PERIOD: ICD-10-CM

## 2022-09-21 PROBLEM — Z3A.38 38 WEEKS GESTATION OF PREGNANCY: Status: RESOLVED | Noted: 2022-03-18 | Resolved: 2022-09-21

## 2022-09-21 LAB
SL AMB  POCT GLUCOSE, UA: NEGATIVE
SL AMB POCT URINE PROTEIN: NEGATIVE

## 2022-09-21 NOTE — ASSESSMENT & PLAN NOTE
Confirmed vertex on ultrasound today  Reviewed options with patient of TOLAC vs repeat   At this time with minimal cervical changes patient would like to have repeat    schedule for Friday  Patient has Hibiclens, reviewed nothing to eat or drink after midnight the night prior  Surgery scheduling will call the night before to tell her what time to arrive  Continue fetal kick counts  Reviewed s/s of labor to call with

## 2022-09-22 ENCOUNTER — ANESTHESIA EVENT (INPATIENT)
Dept: LABOR AND DELIVERY | Facility: HOSPITAL | Age: 27
End: 2022-09-22
Payer: COMMERCIAL

## 2022-09-22 PROCEDURE — NC001 PR NO CHARGE: Performed by: OBSTETRICS & GYNECOLOGY
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
## 2022-09-23 ENCOUNTER — HOSPITAL ENCOUNTER (INPATIENT)
Facility: HOSPITAL | Age: 27
LOS: 2 days | Discharge: HOME | DRG: 540 | End: 2022-09-25
Attending: OBSTETRICS & GYNECOLOGY | Admitting: OBSTETRICS & GYNECOLOGY
Payer: COMMERCIAL

## 2022-09-23 ENCOUNTER — ANESTHESIA (INPATIENT)
Dept: LABOR AND DELIVERY | Facility: HOSPITAL | Age: 27
End: 2022-09-23
Payer: COMMERCIAL

## 2022-09-23 DIAGNOSIS — O98.413 CHRONIC HEPATITIS C AFFECTING ANTEPARTUM CARE OF MOTHER, THIRD TRIMESTER (HCC): ICD-10-CM

## 2022-09-23 DIAGNOSIS — O26.899 RH NEGATIVE STATE IN ANTEPARTUM PERIOD: ICD-10-CM

## 2022-09-23 DIAGNOSIS — O34.211 MATERNAL CARE DUE TO LOW TRANSVERSE UTERINE SCAR FROM PREVIOUS CESAREAN DELIVERY: ICD-10-CM

## 2022-09-23 DIAGNOSIS — Z3A.39 39 WEEKS GESTATION OF PREGNANCY: Primary | ICD-10-CM

## 2022-09-23 DIAGNOSIS — B18.2 CHRONIC HEPATITIS C AFFECTING ANTEPARTUM CARE OF MOTHER, THIRD TRIMESTER (HCC): ICD-10-CM

## 2022-09-23 DIAGNOSIS — Z98.891 S/P C-SECTION: ICD-10-CM

## 2022-09-23 DIAGNOSIS — Z67.91 RH NEGATIVE STATE IN ANTEPARTUM PERIOD: ICD-10-CM

## 2022-09-23 DIAGNOSIS — J45.20 MILD INTERMITTENT ASTHMA WITHOUT COMPLICATION: ICD-10-CM

## 2022-09-23 PROBLEM — Z3A.40 40 WEEKS GESTATION OF PREGNANCY: Status: ACTIVE | Noted: 2022-09-23

## 2022-09-23 LAB
ABO GROUP BLD: NORMAL
BASE EXCESS BLDCOV CALC-SCNC: -1.4 MMOL/L (ref 1–9)
BLD GP AB SCN SERPL QL: NEGATIVE
ERYTHROCYTE [DISTWIDTH] IN BLOOD BY AUTOMATED COUNT: 12.9 % (ref 11.6–15.1)
HCO3 BLDCOV-SCNC: 24.7 MMOL/L (ref 12.2–28.6)
HCT VFR BLD AUTO: 35.1 % (ref 34.8–46.1)
HGB BLD-MCNC: 11.9 G/DL (ref 11.5–15.4)
MCH RBC QN AUTO: 33.1 PG (ref 26.8–34.3)
MCHC RBC AUTO-ENTMCNC: 33.9 G/DL (ref 31.4–37.4)
MCV RBC AUTO: 98 FL (ref 82–98)
OXYHGB MFR BLDCOV: 52.1 %
PCO2 BLDCOV: 46.7 MM HG (ref 27–43)
PH BLDCOV: 7.34 [PH] (ref 7.19–7.49)
PLATELET # BLD AUTO: 222 THOUSANDS/UL (ref 149–390)
PMV BLD AUTO: 11.4 FL (ref 8.9–12.7)
PO2 BLDCOV: 23 MM HG (ref 15–45)
RBC # BLD AUTO: 3.59 MILLION/UL (ref 3.81–5.12)
RH BLD: NEGATIVE
RPR SER QL: NORMAL
SAO2 % BLDCOV: 12 ML/DL
SPECIMEN EXPIRATION DATE: NORMAL
WBC # BLD AUTO: 8.41 THOUSAND/UL (ref 4.31–10.16)

## 2022-09-23 PROCEDURE — 85027 COMPLETE CBC AUTOMATED: CPT | Performed by: OBSTETRICS & GYNECOLOGY

## 2022-09-23 PROCEDURE — 86592 SYPHILIS TEST NON-TREP QUAL: CPT | Performed by: OBSTETRICS & GYNECOLOGY

## 2022-09-23 PROCEDURE — 86850 RBC ANTIBODY SCREEN: CPT | Performed by: OBSTETRICS & GYNECOLOGY

## 2022-09-23 PROCEDURE — 86901 BLOOD TYPING SEROLOGIC RH(D): CPT | Performed by: OBSTETRICS & GYNECOLOGY

## 2022-09-23 PROCEDURE — 59514 CESAREAN DELIVERY ONLY: CPT | Performed by: OBSTETRICS & GYNECOLOGY

## 2022-09-23 PROCEDURE — 86900 BLOOD TYPING SEROLOGIC ABO: CPT | Performed by: OBSTETRICS & GYNECOLOGY

## 2022-09-23 PROCEDURE — 82805 BLOOD GASES W/O2 SATURATION: CPT | Performed by: OBSTETRICS & GYNECOLOGY

## 2022-09-23 RX ORDER — METOCLOPRAMIDE HYDROCHLORIDE 5 MG/ML
5 INJECTION INTRAMUSCULAR; INTRAVENOUS EVERY 6 HOURS PRN
Status: ACTIVE | OUTPATIENT
Start: 2022-09-23 | End: 2022-09-24

## 2022-09-23 RX ORDER — ONDANSETRON 2 MG/ML
4 INJECTION INTRAMUSCULAR; INTRAVENOUS EVERY 8 HOURS PRN
Status: DISCONTINUED | OUTPATIENT
Start: 2022-09-23 | End: 2022-09-25 | Stop reason: HOSPADM

## 2022-09-23 RX ORDER — OXYCODONE HYDROCHLORIDE 5 MG/1
10 TABLET ORAL EVERY 4 HOURS PRN
Status: DISCONTINUED | OUTPATIENT
Start: 2022-09-23 | End: 2022-09-25 | Stop reason: HOSPADM

## 2022-09-23 RX ORDER — MORPHINE SULFATE 1 MG/ML
INJECTION, SOLUTION EPIDURAL; INTRATHECAL; INTRAVENOUS AS NEEDED
Status: DISCONTINUED | OUTPATIENT
Start: 2022-09-23 | End: 2022-09-23

## 2022-09-23 RX ORDER — NALOXONE HYDROCHLORIDE 0.4 MG/ML
0.1 INJECTION, SOLUTION INTRAMUSCULAR; INTRAVENOUS; SUBCUTANEOUS
Status: ACTIVE | OUTPATIENT
Start: 2022-09-23 | End: 2022-09-24

## 2022-09-23 RX ORDER — SODIUM CHLORIDE, SODIUM LACTATE, POTASSIUM CHLORIDE, CALCIUM CHLORIDE 600; 310; 30; 20 MG/100ML; MG/100ML; MG/100ML; MG/100ML
125 INJECTION, SOLUTION INTRAVENOUS CONTINUOUS
Status: DISCONTINUED | OUTPATIENT
Start: 2022-09-23 | End: 2022-09-25 | Stop reason: HOSPADM

## 2022-09-23 RX ORDER — NALBUPHINE HCL 10 MG/ML
10 AMPUL (ML) INJECTION
Status: DISCONTINUED | OUTPATIENT
Start: 2022-09-23 | End: 2022-09-25 | Stop reason: HOSPADM

## 2022-09-23 RX ORDER — ONDANSETRON 2 MG/ML
INJECTION INTRAMUSCULAR; INTRAVENOUS AS NEEDED
Status: DISCONTINUED | OUTPATIENT
Start: 2022-09-23 | End: 2022-09-23

## 2022-09-23 RX ORDER — CEFAZOLIN SODIUM 2 G/50ML
2000 SOLUTION INTRAVENOUS ONCE
Status: COMPLETED | OUTPATIENT
Start: 2022-09-23 | End: 2022-09-23

## 2022-09-23 RX ORDER — BUPIVACAINE HYDROCHLORIDE 7.5 MG/ML
INJECTION, SOLUTION INTRASPINAL AS NEEDED
Status: DISCONTINUED | OUTPATIENT
Start: 2022-09-23 | End: 2022-09-23

## 2022-09-23 RX ORDER — DOCUSATE SODIUM 100 MG/1
100 CAPSULE, LIQUID FILLED ORAL 2 TIMES DAILY
Status: DISCONTINUED | OUTPATIENT
Start: 2022-09-23 | End: 2022-09-25 | Stop reason: HOSPADM

## 2022-09-23 RX ORDER — DEXAMETHASONE SODIUM PHOSPHATE 4 MG/ML
8 INJECTION, SOLUTION INTRA-ARTICULAR; INTRALESIONAL; INTRAMUSCULAR; INTRAVENOUS; SOFT TISSUE ONCE AS NEEDED
Status: ACTIVE | OUTPATIENT
Start: 2022-09-23 | End: 2022-09-24

## 2022-09-23 RX ORDER — OXYTOCIN/RINGER'S LACTATE 30/500 ML
62.5 PLASTIC BAG, INJECTION (ML) INTRAVENOUS ONCE
Status: COMPLETED | OUTPATIENT
Start: 2022-09-23 | End: 2022-09-23

## 2022-09-23 RX ORDER — IBUPROFEN 600 MG/1
600 TABLET ORAL EVERY 6 HOURS
Status: DISCONTINUED | OUTPATIENT
Start: 2022-09-25 | End: 2022-09-25 | Stop reason: HOSPADM

## 2022-09-23 RX ORDER — OXYCODONE HYDROCHLORIDE 5 MG/1
5 TABLET ORAL EVERY 4 HOURS PRN
Status: DISCONTINUED | OUTPATIENT
Start: 2022-09-23 | End: 2022-09-25 | Stop reason: HOSPADM

## 2022-09-23 RX ORDER — EPHEDRINE SULFATE 50 MG/ML
INJECTION INTRAVENOUS AS NEEDED
Status: DISCONTINUED | OUTPATIENT
Start: 2022-09-23 | End: 2022-09-23

## 2022-09-23 RX ORDER — ONDANSETRON 2 MG/ML
4 INJECTION INTRAMUSCULAR; INTRAVENOUS EVERY 4 HOURS PRN
Status: ACTIVE | OUTPATIENT
Start: 2022-09-23 | End: 2022-09-24

## 2022-09-23 RX ORDER — DIPHENHYDRAMINE HYDROCHLORIDE 50 MG/ML
25 INJECTION INTRAMUSCULAR; INTRAVENOUS EVERY 6 HOURS PRN
Status: ACTIVE | OUTPATIENT
Start: 2022-09-23 | End: 2022-09-24

## 2022-09-23 RX ORDER — MORPHINE SULFATE 4 MG/ML
4 INJECTION, SOLUTION INTRAMUSCULAR; INTRAVENOUS
Status: DISCONTINUED | OUTPATIENT
Start: 2022-09-23 | End: 2022-09-25 | Stop reason: HOSPADM

## 2022-09-23 RX ORDER — ONDANSETRON 2 MG/ML
4 INJECTION INTRAMUSCULAR; INTRAVENOUS ONCE AS NEEDED
Status: DISCONTINUED | OUTPATIENT
Start: 2022-09-23 | End: 2022-09-25 | Stop reason: HOSPADM

## 2022-09-23 RX ORDER — OXYTOCIN/RINGER'S LACTATE 30/500 ML
PLASTIC BAG, INJECTION (ML) INTRAVENOUS CONTINUOUS PRN
Status: DISCONTINUED | OUTPATIENT
Start: 2022-09-23 | End: 2022-09-23

## 2022-09-23 RX ORDER — ACETAMINOPHEN 325 MG/1
650 TABLET ORAL EVERY 4 HOURS PRN
Status: DISCONTINUED | OUTPATIENT
Start: 2022-09-23 | End: 2022-09-25 | Stop reason: HOSPADM

## 2022-09-23 RX ORDER — DIAPER,BRIEF,INFANT-TODD,DISP
1 EACH MISCELLANEOUS DAILY PRN
Status: DISCONTINUED | OUTPATIENT
Start: 2022-09-23 | End: 2022-09-25 | Stop reason: HOSPADM

## 2022-09-23 RX ORDER — FENTANYL CITRATE/PF 50 MCG/ML
25 SYRINGE (ML) INJECTION
Status: DISCONTINUED | OUTPATIENT
Start: 2022-09-23 | End: 2022-09-25 | Stop reason: HOSPADM

## 2022-09-23 RX ORDER — KETOROLAC TROMETHAMINE 30 MG/ML
30 INJECTION, SOLUTION INTRAMUSCULAR; INTRAVENOUS EVERY 6 HOURS SCHEDULED
Status: COMPLETED | OUTPATIENT
Start: 2022-09-23 | End: 2022-09-24

## 2022-09-23 RX ORDER — ACETAMINOPHEN 325 MG/1
650 TABLET ORAL EVERY 6 HOURS
Status: DISCONTINUED | OUTPATIENT
Start: 2022-09-23 | End: 2022-09-25 | Stop reason: HOSPADM

## 2022-09-23 RX ORDER — TRISODIUM CITRATE DIHYDRATE AND CITRIC ACID MONOHYDRATE 500; 334 MG/5ML; MG/5ML
30 SOLUTION ORAL ONCE
Status: COMPLETED | OUTPATIENT
Start: 2022-09-23 | End: 2022-09-23

## 2022-09-23 RX ORDER — ONDANSETRON 2 MG/ML
4 INJECTION INTRAMUSCULAR; INTRAVENOUS EVERY 8 HOURS PRN
Status: DISCONTINUED | OUTPATIENT
Start: 2022-09-23 | End: 2022-09-23

## 2022-09-23 RX ORDER — HYDROMORPHONE HCL/PF 1 MG/ML
0.5 SYRINGE (ML) INJECTION EVERY 2 HOUR PRN
Status: DISCONTINUED | OUTPATIENT
Start: 2022-09-23 | End: 2022-09-25 | Stop reason: HOSPADM

## 2022-09-23 RX ORDER — CALCIUM CARBONATE 200(500)MG
1000 TABLET,CHEWABLE ORAL DAILY PRN
Status: DISCONTINUED | OUTPATIENT
Start: 2022-09-23 | End: 2022-09-25 | Stop reason: HOSPADM

## 2022-09-23 RX ORDER — DIPHENHYDRAMINE HYDROCHLORIDE 50 MG/ML
25 INJECTION INTRAMUSCULAR; INTRAVENOUS EVERY 6 HOURS PRN
Status: DISCONTINUED | OUTPATIENT
Start: 2022-09-24 | End: 2022-09-25 | Stop reason: HOSPADM

## 2022-09-23 RX ADMIN — ACETAMINOPHEN 325MG 650 MG: 325 TABLET ORAL at 16:28

## 2022-09-23 RX ADMIN — EPHEDRINE SULFATE 10 MG: 50 INJECTION INTRAVENOUS at 08:15

## 2022-09-23 RX ADMIN — MORPHINE SULFATE 0.2 MG: 1 INJECTION, SOLUTION EPIDURAL; INTRATHECAL; INTRAVENOUS at 08:14

## 2022-09-23 RX ADMIN — KETOROLAC TROMETHAMINE 30 MG: 30 INJECTION, SOLUTION INTRAMUSCULAR; INTRAVENOUS at 18:39

## 2022-09-23 RX ADMIN — ONDANSETRON 4 MG: 2 INJECTION INTRAMUSCULAR; INTRAVENOUS at 08:26

## 2022-09-23 RX ADMIN — NALBUPHINE HYDROCHLORIDE 10 MG: 10 INJECTION, SOLUTION INTRAMUSCULAR; INTRAVENOUS; SUBCUTANEOUS at 15:43

## 2022-09-23 RX ADMIN — SODIUM CITRATE AND CITRIC ACID 30 ML: 334; 500 LIQUID ORAL at 06:47

## 2022-09-23 RX ADMIN — KETOROLAC TROMETHAMINE 30 MG: 30 INJECTION, SOLUTION INTRAMUSCULAR; INTRAVENOUS at 12:11

## 2022-09-23 RX ADMIN — Medication 250 MILLI-UNITS/MIN: at 08:33

## 2022-09-23 RX ADMIN — OXYCODONE HYDROCHLORIDE 5 MG: 5 TABLET ORAL at 19:33

## 2022-09-23 RX ADMIN — DOCUSATE SODIUM 100 MG: 100 CAPSULE, LIQUID FILLED ORAL at 18:39

## 2022-09-23 RX ADMIN — SODIUM CHLORIDE, SODIUM LACTATE, POTASSIUM CHLORIDE, AND CALCIUM CHLORIDE: .6; .31; .03; .02 INJECTION, SOLUTION INTRAVENOUS at 09:14

## 2022-09-23 RX ADMIN — CEFAZOLIN SODIUM 2000 MG: 2 SOLUTION INTRAVENOUS at 08:08

## 2022-09-23 RX ADMIN — KETOROLAC TROMETHAMINE 30 MG: 30 INJECTION, SOLUTION INTRAMUSCULAR; INTRAVENOUS at 23:35

## 2022-09-23 RX ADMIN — SODIUM CHLORIDE, SODIUM LACTATE, POTASSIUM CHLORIDE, AND CALCIUM CHLORIDE: .6; .31; .03; .02 INJECTION, SOLUTION INTRAVENOUS at 07:31

## 2022-09-23 RX ADMIN — SODIUM CHLORIDE, SODIUM LACTATE, POTASSIUM CHLORIDE, AND CALCIUM CHLORIDE 1000 ML: .6; .31; .03; .02 INJECTION, SOLUTION INTRAVENOUS at 06:37

## 2022-09-23 RX ADMIN — EPHEDRINE SULFATE 5 MG: 50 INJECTION INTRAVENOUS at 08:26

## 2022-09-23 RX ADMIN — SODIUM CHLORIDE, SODIUM LACTATE, POTASSIUM CHLORIDE, AND CALCIUM CHLORIDE 125 ML/HR: .6; .31; .03; .02 INJECTION, SOLUTION INTRAVENOUS at 10:30

## 2022-09-23 RX ADMIN — Medication 250 MILLI-UNITS/MIN: at 09:16

## 2022-09-23 RX ADMIN — BUPIVACAINE HYDROCHLORIDE IN DEXTROSE 1.7 ML: 7.5 INJECTION, SOLUTION SUBARACHNOID at 08:14

## 2022-09-23 RX ADMIN — ACETAMINOPHEN 325MG 650 MG: 325 TABLET ORAL at 09:53

## 2022-09-23 RX ADMIN — ACETAMINOPHEN 325MG 650 MG: 325 TABLET ORAL at 22:08

## 2022-09-23 RX ADMIN — BENZOCAINE AND LEVOMENTHOL 1 APPLICATION: 200; 5 SPRAY TOPICAL at 11:06

## 2022-09-23 RX ADMIN — WITCH HAZEL 1 PAD: 500 SOLUTION RECTAL; TOPICAL at 11:06

## 2022-09-23 RX ADMIN — Medication 62.5 MILLI-UNITS/MIN: at 10:44

## 2022-09-23 RX ADMIN — PHENYLEPHRINE HYDROCHLORIDE 30 MCG/MIN: 10 INJECTION INTRAVENOUS at 08:14

## 2022-09-23 NOTE — OP NOTE
OPERATIVE REPORT  PATIENT NAME: Martita Mackay    :  1995  MRN: [id number]  Pt Location:  L&D OR ROOM 01    SURGERY DATE: 2022    Surgeon(s) and Role:     * Abby Cortez,  - Primary     * Shyann Bhandari MD - Co-surgeon    No qualified surgical resident was available to assist in the case  The assistance of an additional surgeon was critical for completion of the case  The assistant surgeon provided assistance with exposure, hemostasis, delivery of the infant, tissue manipulation, and suturing  The assistant surgeon was present from the start of the procedure until fascial closure  I, the primary surgeon, was present and scrubbed throughout the entirety of the case and performed all key portions of the surgical procedure  Procedure(s) (LRB):   SECTION () (N/A)    PreOperative Diagnosis:   1  Ny pregnancy in cephalic presentation at 58T0W gestation for scheduled repeat  section    Post Operative Diagnosis:   1  Same  2  Extensive uterine and pelvic adhesions    Anesthesia: Spinal    UOP: clear via maldonado catheter    EBL: 745 mL    Complications: none    Specimens:   1  Cord Blood  2  Placenta to storage    Indication for Surgery:   Martita Mackay is a 32 y o   at 40w0d for scheduled repeat  section for history of prior  section  All risks, benefits, and alternatives were discussed with the patient  All questions were answered  The patient agreed to proceed with surgery  Findings:   1  Live female infant delivered from cephalic position through clear fluid at 0831, weight 3410 grams, Apgar scores of  9 and 9 at 1 and 5 minutes, respectively  No nuchal cord  2  Intact placenta delivered via fundal massage, 3-vessel cord noted  3  Normal appearing left fallopian tube and ovary  Unable to visualize right ovary and tube due to omental and peritoneal adhesions    4   Dense adhesion of rectus muscle, omentum and peritoneum to right adnexa and the uterus  Description of Procedure: The patient was taken to the operating room, where she was placed under spinal anesthesia  The patient was then placed in supine position with a leftward tilt  She was prepped and draped in the normal sterile fashion  When anesthesia was found to be adequate, a Pfannenstiel skin incision was made with a scalpel and carried down to the underlying layer of fascia  The fascia was then incised with a scalpel and extended laterally and superiorly with curved Alvarez scissors  The superior portion of the fascial incision was then grasped with two Kocher clamps, elevated, and  from the underlying rectus muscles with sharp and blunt dissection  Attention was then turned to the inferior aspect of the fascial incision, which in a similar manner, was grasped with two Kocher clamps, elevated, and  from the underlying rectus muscles using sharp and blunt dissection  The rectus muscles were then  in the midline  Right sided rectus muscle was noted to be adhered to lower and mid portion of the uterus  Adhesions were excised from mid to lower uterine segment using electrocautery to obtain adequate uterine exposure to perform hysterotomy  The peritoneum on left lateral side of uterus was entered using Metzenbaum scissors  The peritoneal incision was then extended superiorly, inferiorly, and laterally using Metzenbaum scissors and electrocautery with excellent visualization of the bladder  When adequate exposure had been achieved, the bladder blade was then placed  The vesicouterine peritoneum was identified  A low transverse uterine incision was then made with a scalpel  The uterus was entered bluntly and the hysterotomy was extended bluntly in a cephalad-caudad manner  Amniotomy was performed  The infant was then delivered in cephalic presentation   After delayed cord clamping for 30 seconds, the cord was clamped and cut, and the infant was handed off to awaiting Pediatricians  Cord blood was collected and the placenta was delivered with fundal massage noted to be intact with 3-vessel cord  The uterus cleared of all clots and debris  Uterus could not be exteriorized from the abdomen due to adhesions  The hysterotomy was then closed with 0-Vicryl suture in a running locked fashion  A second layer of the same suture was used in a imbricating fashion in order to obtain excellent hemostasis  Bleeding was noted from mid portion of uterus from the site of lysed adhesions  The disrupted serosa was re-approximated using 2-0 Vicryl on SH needle and 0-Vicryl on CT needle with inturrupted and figure of eight stitches  Excellent hemostasis was noted  The gutters were cleared of all clots and debris  The hysterotomy was inspected again and noted to be hemostatic  Surgicel powder was placed on mid to lower uterine segment at the site of re-approximated uterine serosa  The fascia was then closed with 0 Vicryl suture in a running fashion  The skin was closed with Insorb absorbable staples and the incision dressed with steri-strips  The patient tolerated the procedure well  Sponge, lap, and needle counts were correct x 3  The patient was taken to the Recovery Room in stable condition              Shi Art DO  9/23/2022  10:30 AM

## 2022-09-23 NOTE — PLAN OF CARE
Problem: PAIN - ADULT  Goal: Verbalizes/displays adequate comfort level or baseline comfort level  Description: Interventions:  - Encourage patient to monitor pain and request assistance  - Assess pain using appropriate pain scale  - Administer analgesics based on type and severity of pain and evaluate response  - Implement non-pharmacological measures as appropriate and evaluate response  - Consider cultural and social influences on pain and pain management  - Notify physician/advanced practitioner if interventions unsuccessful or patient reports new pain  Outcome: Progressing     Problem: INFECTION - ADULT  Goal: Absence or prevention of progression during hospitalization  Description: INTERVENTIONS:  - Assess and monitor for signs and symptoms of infection  - Monitor lab/diagnostic results  - Monitor all insertion sites, i e  indwelling lines, tubes, and drains  - Monitor endotracheal if appropriate and nasal secretions for changes in amount and color  - [place] appropriate cooling/warming therapies per order  - Administer medications as ordered  - Instruct and encourage patient and family to use good hand hygiene technique  - Identify and instruct in appropriate isolation precautions for identified infection/condition  Outcome: Progressing  Goal: Absence of fever/infection during neutropenic period  Description: INTERVENTIONS:  - Monitor WBC    Outcome: Progressing     Problem: SAFETY ADULT  Goal: Patient will remain free of falls  Description: INTERVENTIONS:  - Educate patient/family on patient safety including physical limitations  - Instruct patient to call for assistance with activity   - Consult OT/PT to assist with strengthening/mobility   - Keep Call bell within reach  - Keep bed low and locked with side rails adjusted as appropriate  - Keep care items and personal belongings within reach  - Initiate and maintain comfort rounds  - Make Fall Risk Sign visible to staff  - Offer Toileting every  Hours, in advance of need  - Initiate/Maintain alarm  - Obtain necessary fall risk management equipment:   - Apply yellow socks and bracelet for high fall risk patients  - Consider moving patient to room near nurses station  Outcome: Progressing  Goal: Maintain or return to baseline ADL function  Description: INTERVENTIONS:  -  Assess patient's ability to carry out ADLs; assess patient's baseline for ADL function and identify physical deficits which impact ability to perform ADLs (bathing, care of mouth/teeth, toileting, grooming, dressing, etc )  - Assess/evaluate cause of self-care deficits   - Assess range of motion  - Assess patient's mobility; develop plan if impaired  - Assess patient's need for assistive devices and provide as appropriate  - Encourage maximum independence but intervene and supervise when necessary  - Involve family in performance of ADLs  - Assess for home care needs following discharge   - Consider OT consult to assist with ADL evaluation and planning for discharge  - Provide patient education as appropriate  Outcome: Progressing  Goal: Maintains/Returns to pre admission functional level  Description: INTERVENTIONS:  - Perform BMAT or MOVE assessment daily    - Set and communicate daily mobility goal to care team and patient/family/caregiver  - Collaborate with rehabilitation services on mobility goals if consulted  - Perform Range of Motion  times a day  - Reposition patient every  hours    - Dangle patient  times a day  - Stand patient times a day  - Ambulate patient  times a day  - Out of bed to chair  times a day   - Out of bed for meals  times a day  - Out of bed for toileting  - Record patient progress and toleration of activity level   Outcome: Progressing     Problem: Knowledge Deficit  Goal: Patient/family/caregiver demonstrates understanding of disease process, treatment plan, medications, and discharge instructions  Description: Complete learning assessment and assess knowledge base   Interventions:  - Provide teaching at level of understanding  - Provide teaching via preferred learning methods  Outcome: Progressing     Problem: DISCHARGE PLANNING  Goal: Discharge to home or other facility with appropriate resources  Description: INTERVENTIONS:  - Identify barriers to discharge w/patient and caregiver  - Arrange for needed discharge resources and transportation as appropriate  - Identify discharge learning needs (meds, wound care, etc )  - Arrange for interpretive services to assist at discharge as needed  - Refer to Case Management Department for coordinating discharge planning if the patient needs post-hospital services based on physician/advanced practitioner order or complex needs related to functional status, cognitive ability, or social support system  Outcome: Progressing     Problem: POSTPARTUM  Goal: Experiences normal postpartum course  Description: INTERVENTIONS:  - Monitor maternal vital signs  - Assess uterine involution and lochia  Outcome: Progressing  Goal: Appropriate maternal -  bonding  Description: INTERVENTIONS:  - Identify family support  - Assess for appropriate maternal/infant bonding   -Encourage maternal/infant bonding opportunities  - Referral to  or  as needed  Outcome: Progressing  Goal: Establishment of infant feeding pattern  Description: INTERVENTIONS:  - Assess breast/bottle feeding  - Refer to lactation as needed  Outcome: Progressing  Goal: Incision(s), wounds(s) or drain site(s) healing without S/S of infection  Description: INTERVENTIONS  - Assess and document dressing, incision, wound bed, drain sites and surrounding tissue  - Provide patient and family education  - Perform skin care/dressing changes every 2  Outcome: Progressing

## 2022-09-23 NOTE — ANESTHESIA PROCEDURE NOTES
Spinal Block    Patient location during procedure: ICU  Start time: 9/23/2022 8:14 AM  Reason for block: procedure for pain, at surgeon's request and primary anesthetic  Staffing  Performed: CRNA   Resident/CRNA: Lloyd Davis CRNA  Preanesthetic Checklist  Completed: patient identified, IV checked, site marked, risks and benefits discussed, surgical consent, monitors and equipment checked, pre-op evaluation and timeout performed  Spinal Block  Patient position: sitting  Prep: ChloraPrep  Patient monitoring: continuous pulse ox, frequent blood pressure checks and heart rate  Approach: midline  Location: L2-3  Injection technique: single-shot  Needle  Needle type: pencil-tip   Needle gauge: 25 G  Needle length: 10 cm  Assessment  Sensory level: T4  Injection Assessment:  negative aspiration for heme, no paresthesia on injection and positive aspiration for clear CSF    Post-procedure:  adhesive bandage applied, pressure dressing applied, secured with tape, site cleaned and sterile dressing applied

## 2022-09-23 NOTE — PLAN OF CARE
Problem: BIRTH - VAGINAL/ SECTION  Goal: Fetal and maternal status remain reassuring during the birth process  Description: INTERVENTIONS:  - Monitor vital signs  - Monitor fetal heart rate  - Monitor uterine activity  - Monitor labor progression (vaginal delivery)  - DVT prophylaxis  - Antibiotic prophylaxis  Outcome: Completed  Goal: Emotionally satisfying birthing experience for mother/fetus  Description: Interventions:  - Assess, plan, implement and evaluate the nursing care given to the patient in labor  - Advocate the philosophy that each childbirth experience is a unique experience and support the family's chosen level of involvement and control during the labor process   - Actively participate in both the patient's and family's teaching of the birth process  - Consider cultural, Alevism and age-specific factors and plan care for the patient in labor  Outcome: Completed

## 2022-09-23 NOTE — PLAN OF CARE
Problem: BIRTH - VAGINAL/ SECTION  Goal: Fetal and maternal status remain reassuring during the birth process  Description: INTERVENTIONS:  - Monitor vital signs  - Monitor fetal heart rate  - Monitor uterine activity  - Monitor labor progression (vaginal delivery)  - DVT prophylaxis  - Antibiotic prophylaxis  Outcome: Completed  Goal: Emotionally satisfying birthing experience for mother/fetus  Description: Interventions:  - Assess, plan, implement and evaluate the nursing care given to the patient in labor  - Advocate the philosophy that each childbirth experience is a unique experience and support the family's chosen level of involvement and control during the labor process   - Actively participate in both the patient's and family's teaching of the birth process  - Consider cultural, Islam and age-specific factors and plan care for the patient in labor  Outcome: Completed     Problem: POSTPARTUM  Goal: Experiences normal postpartum course  Description: INTERVENTIONS:  - Monitor maternal vital signs  - Assess uterine involution and lochia  Outcome: Progressing  Goal: Appropriate maternal -  bonding  Description: INTERVENTIONS:  - Identify family support  - Assess for appropriate maternal/infant bonding   -Encourage maternal/infant bonding opportunities  - Referral to  or  as needed  Outcome: Progressing  Goal: Establishment of infant feeding pattern  Description: INTERVENTIONS:  - Assess breast/bottle feeding  - Refer to lactation as needed  Outcome: Progressing  Goal: Incision(s), wounds(s) or drain site(s) healing without S/S of infection  Description: INTERVENTIONS  - Assess and document dressing, incision, wound bed, drain sites and surrounding tissue  - Provide patient and family education    Outcome: Progressing     Problem: PAIN - ADULT  Goal: Verbalizes/displays adequate comfort level or baseline comfort level  Description: Interventions:  - Encourage patient to monitor pain and request assistance  - Assess pain using appropriate pain scale  - Administer analgesics based on type and severity of pain and evaluate response  - Implement non-pharmacological measures as appropriate and evaluate response  - Consider cultural and social influences on pain and pain management  - Notify physician/advanced practitioner if interventions unsuccessful or patient reports new pain  Outcome: Progressing

## 2022-09-23 NOTE — LACTATION NOTE
This note was copied from a baby's chart  Met with parents to discuss feeding plan  Mother is planning to breastfeed her baby  She discussed that her baby latched on well for first feeding and reported not discomfort with latch  The Ready, Set, Baby Booklet was discussed  Discussed importance of skin to skin to help baby awaken for breastfeeding and to help with milk production as well as stabilize temperature, blood sugars, decrease pain, promote relaxation, and calm the baby as well as for bonding (father may do as well)  Showed images of tummy size progression as milk production increases to meet the nutritional/growing needs of the baby and risks associated with introducing supplementation that would disrupt the process  Discussed alternative manner to providing milk to baby when sleepy and/or unable to latch  Discussed Second Night Syndrome explaining how babys cluster feed to meet needs  Growth spurts explained and how cluster feeding helps boost milk supply  Explained feeding cues and fullness cues as well as importance of obtaining a deep latch for effective milk removal and proper positioning (tummy to tummy, at level, nose to nipple, bring chin to breast first and bringing baby to breast) with ear, shoulder, and hip alignment  Demonstrated on breast model how to hold, compress and perform hand expression  Mother discussed that she has a Medela breast pump for home use  She was encouraged to call for a latch assessment and/or questions that arise

## 2022-09-23 NOTE — ANESTHESIA POSTPROCEDURE EVALUATION
Post-Op Assessment Note    CV Status:  Stable  Pain Score: 0    Pain management: adequate     Mental Status:  Alert and awake   Hydration Status:  Euvolemic and stable   PONV Controlled:  None   Airway Patency:  Patent      Post Op Vitals Reviewed: Yes      Staff: CRNA         No complications documented      BP 98/57 (09/23/22 0940)    Temp      Pulse 77 (09/23/22 0940)   Resp 22 (09/23/22 0940)    SpO2 95 % (09/23/22 0940)

## 2022-09-24 LAB
ERYTHROCYTE [DISTWIDTH] IN BLOOD BY AUTOMATED COUNT: 13 % (ref 11.6–15.1)
HCT VFR BLD AUTO: 27.1 % (ref 34.8–46.1)
HGB BLD-MCNC: 9.2 G/DL (ref 11.5–15.4)
MCH RBC QN AUTO: 33.2 PG (ref 26.8–34.3)
MCHC RBC AUTO-ENTMCNC: 33.9 G/DL (ref 31.4–37.4)
MCV RBC AUTO: 98 FL (ref 82–98)
PLATELET # BLD AUTO: 179 THOUSANDS/UL (ref 149–390)
PMV BLD AUTO: 11.2 FL (ref 8.9–12.7)
RBC # BLD AUTO: 2.77 MILLION/UL (ref 3.81–5.12)
WBC # BLD AUTO: 10.74 THOUSAND/UL (ref 4.31–10.16)

## 2022-09-24 PROCEDURE — 85027 COMPLETE CBC AUTOMATED: CPT | Performed by: OBSTETRICS & GYNECOLOGY

## 2022-09-24 PROCEDURE — 99024 POSTOP FOLLOW-UP VISIT: CPT | Performed by: OBSTETRICS & GYNECOLOGY

## 2022-09-24 RX ORDER — BACITRACIN, NEOMYCIN, POLYMYXIN B 400; 3.5; 5 [USP'U]/G; MG/G; [USP'U]/G
1 OINTMENT TOPICAL 3 TIMES DAILY
Status: DISCONTINUED | OUTPATIENT
Start: 2022-09-24 | End: 2022-09-25 | Stop reason: HOSPADM

## 2022-09-24 RX ADMIN — DOCUSATE SODIUM 100 MG: 100 CAPSULE, LIQUID FILLED ORAL at 18:18

## 2022-09-24 RX ADMIN — KETOROLAC TROMETHAMINE 30 MG: 30 INJECTION, SOLUTION INTRAMUSCULAR; INTRAVENOUS at 06:07

## 2022-09-24 RX ADMIN — OXYCODONE HYDROCHLORIDE 5 MG: 5 TABLET ORAL at 10:30

## 2022-09-24 RX ADMIN — ACETAMINOPHEN 325MG 650 MG: 325 TABLET ORAL at 04:06

## 2022-09-24 RX ADMIN — ACETAMINOPHEN 325MG 650 MG: 325 TABLET ORAL at 22:26

## 2022-09-24 RX ADMIN — DOCUSATE SODIUM 100 MG: 100 CAPSULE, LIQUID FILLED ORAL at 08:13

## 2022-09-24 RX ADMIN — ACETAMINOPHEN 325MG 650 MG: 325 TABLET ORAL at 10:12

## 2022-09-24 RX ADMIN — ACETAMINOPHEN 325MG 650 MG: 325 TABLET ORAL at 16:11

## 2022-09-24 RX ADMIN — KETOROLAC TROMETHAMINE 30 MG: 30 INJECTION, SOLUTION INTRAMUSCULAR; INTRAVENOUS at 18:18

## 2022-09-24 RX ADMIN — BACITRACIN ZINC, NEOMYCIN, POLYMYXIN B 1 SMALL APPLICATION: 400; 3.5; 5 OINTMENT TOPICAL at 16:11

## 2022-09-24 RX ADMIN — BACITRACIN ZINC, NEOMYCIN, POLYMYXIN B 1 SMALL APPLICATION: 400; 3.5; 5 OINTMENT TOPICAL at 22:27

## 2022-09-24 RX ADMIN — KETOROLAC TROMETHAMINE 30 MG: 30 INJECTION, SOLUTION INTRAMUSCULAR; INTRAVENOUS at 12:22

## 2022-09-24 RX ADMIN — BACITRACIN ZINC, NEOMYCIN, POLYMYXIN B 1 SMALL APPLICATION: 400; 3.5; 5 OINTMENT TOPICAL at 12:22

## 2022-09-24 NOTE — PLAN OF CARE
Problem: POSTPARTUM  Goal: Experiences normal postpartum course  Description: INTERVENTIONS:  - Monitor maternal vital signs  - Assess uterine involution and lochia  Outcome: Progressing  Goal: Appropriate maternal -  bonding  Description: INTERVENTIONS:  - Identify family support  - Assess for appropriate maternal/infant bonding   -Encourage maternal/infant bonding opportunities  - Referral to  or  as needed  Outcome: Progressing  Goal: Establishment of infant feeding pattern  Description: INTERVENTIONS:  - Assess breast/bottle feeding  - Refer to lactation as needed  Outcome: Progressing  Goal: Incision(s), wounds(s) or drain site(s) healing without S/S of infection  Description: INTERVENTIONS  - Assess and document dressing, incision, wound bed, drain sites and surrounding tissue  - Provide patient and family education  Outcome: Progressing     Problem: PAIN - ADULT  Goal: Verbalizes/displays adequate comfort level or baseline comfort level  Description: Interventions:  - Encourage patient to monitor pain and request assistance  - Assess pain using appropriate pain scale  - Administer analgesics based on type and severity of pain and evaluate response  - Implement non-pharmacological measures as appropriate and evaluate response  - Consider cultural and social influences on pain and pain management  - Notify physician/advanced practitioner if interventions unsuccessful or patient reports new pain  Outcome: Progressing     Problem: INFECTION - ADULT  Goal: Absence or prevention of progression during hospitalization  Description: INTERVENTIONS:  - Assess and monitor for signs and symptoms of infection  - Monitor lab/diagnostic results  - Monitor all insertion sites, i e  indwelling lines, tubes, and drains  - Monitor endotracheal if appropriate and nasal secretions for changes in amount and color  - [place] appropriate cooling/warming therapies per order  - Administer medications as ordered  - Instruct and encourage patient and family to use good hand hygiene technique  - Identify and instruct in appropriate isolation precautions for identified infection/condition  Outcome: Progressing     Problem: INFECTION - ADULT  Goal: Absence or prevention of progression during hospitalization  Description: INTERVENTIONS:  - Assess and monitor for signs and symptoms of infection  - Monitor lab/diagnostic results  - Monitor all insertion sites, i e  indwelling lines, tubes, and drains  - Monitor endotracheal if appropriate and nasal secretions for changes in amount and color  - [place] appropriate cooling/warming therapies per order  - Administer medications as ordered  - Instruct and encourage patient and family to use good hand hygiene technique  - Identify and instruct in appropriate isolation precautions for identified infection/condition  Outcome: Progressing  Goal: Absence of fever/infection during neutropenic period  Description: INTERVENTIONS:  - Monitor WBC    Outcome: Progressing

## 2022-09-24 NOTE — ASSESSMENT & PLAN NOTE
Preop hgb 11 9g/dl, post op hgb 9 2g/dl  Reviewed IV iron vs PO iron  Pt wishes PO iron  Will start upon discharge

## 2022-09-24 NOTE — PROGRESS NOTES
Progress Note - OB/GYN  Post-Partum Physician Note   Roselia Frank 32 y o  female MRN: [id number]  Unit/Bed#: -01 Encounter: [id number]    Patient is postop and postpartum day 1 from a  (repeat) with spinal anesthesia  Subjective:   Pain: controlled  Tolerating Oral Intake: yes  Voiding: yes  Flatus: yes  Bowel Movement: no  Ambulating: yes  Breastfeeding: Breastfeeding  Shortness of Breath: no  Leg Pain/Discomfort: no  Lochia: normal      Objective:   Vitals:    22 1931 22 2335 22 0300 22 0811   BP: 100/67 113/69 113/77 104/64   BP Location: Left arm Left arm Left arm Left arm   Pulse: 61 78 85 83   Resp: 16 16 18 18   Temp: 97 7 °F (36 5 °C) 97 7 °F (36 5 °C) 97 9 °F (36 6 °C) 97 8 °F (36 6 °C)   TempSrc: Oral Oral Oral Oral   SpO2: 96% 96% 96%    Weight:       Height:           Intake/Output Summary (Last 24 hours) at 2022 1136  Last data filed at 2022 0400  Gross per 24 hour   Intake --   Output 700 ml   Net -700 ml       Physical Exam:  General: in no apparent distress  Abdomen: abdomen is soft without significant tenderness  Fundus: Firm, 2 below the umbilicus  Incision: C/D/I - small tape blisters on right side 6 inches lateral to incision  Lower extremities: nontender      Labs/Tests:   Lab Results   Component Value Date    WBC 10 74 (H) 2022    HGB 9 2 (L) 2022    HCT 27 1 (L) 2022    MCV 98 2022     2022       Brief OB Lab review:  ABO Grouping   Date Value Ref Range Status   2022 O  Final      Rh Factor   Date Value Ref Range Status   2022 Negative  Final     Rh Type   Date Value Ref Range Status   2022 Negative  Final     Comment:     Please note: Prior records for this patient's ABO / Rh type are not  available for additional verification          External Rubella IGG Quantitation   Date Value Ref Range Status   2022 Immune  Final         MEDS:   Current Facility-Administered Medications Medication Dose Route Frequency    acetaminophen (TYLENOL) tablet 650 mg  650 mg Oral Q6H    acetaminophen (TYLENOL) tablet 650 mg  650 mg Oral Q4H PRN    benzocaine-menthol-lanolin-aloe (DERMOPLAST) 20-0 5 % topical spray 1 application  1 application Topical D6P PRN    calcium carbonate (TUMS) chewable tablet 1,000 mg  1,000 mg Oral Daily PRN    diphenhydrAMINE (BENADRYL) injection 25 mg  25 mg Intravenous Q6H PRN    docusate sodium (COLACE) capsule 100 mg  100 mg Oral BID    fentaNYL (SUBLIMAZE) injection 25 mcg  25 mcg Intravenous Q3 min PRN    hydrocortisone 1 % cream 1 application  1 application Topical Daily PRN    HYDROmorphone (DILAUDID) injection 0 5 mg  0 5 mg Intravenous Q2H PRN    ketorolac (TORADOL) injection 30 mg  30 mg Intravenous Q6H [address]    Followed by   Vanesa Almonte ON 9/25/2022] ibuprofen (MOTRIN) tablet 600 mg  600 mg Oral Q6H    lactated ringers infusion  125 mL/hr Intravenous Continuous    morphine injection 4 mg  4 mg Intravenous Q5 Min PRN    nalbuphine (NUBAIN) injection 10 mg  10 mg Intravenous Q3H PRN    neomycin-bacitracin-polymyxin b (NEOSPORIN) ointment 1 small application  1 small application Topical TID    ondansetron (ZOFRAN) injection 4 mg  4 mg Intravenous Q8H PRN    ondansetron (ZOFRAN) injection 4 mg  4 mg Intravenous Once PRN    oxyCODONE (ROXICODONE) IR tablet 10 mg  10 mg Oral Q4H PRN    oxyCODONE (ROXICODONE) IR tablet 5 mg  5 mg Oral Q4H PRN    witch hazel-glycerin (TUCKS) topical pad 1 pad  1 pad Topical Q4H PRN     Invasive Devices  Timeline    Peripheral Intravenous Line  Duration           Peripheral IV 09/23/22 Left;Ventral (anterior) Forearm 1 day                Assessment and Plan:  32y o  year-old [id number], postpartum day 1 status-post repeat LTCS    Continue routine postpartum care  Encourage ambulation  VTE ppx:ambulation  (Postpartum VTE risk score of 2 for c/s and BMI, no need to treat per protocol)  Planning discharge 1-2 weeks         negative state in antepartum period  Baby blood type O neg - no Rhogam necessary  Postpartum anemia  Preop hgb 11 9g/dl, post op hgb 9 2g/dl  Reviewed IV iron vs PO iron  Pt wishes PO iron  Will start upon discharge        Charles Lombardi MD

## 2022-09-25 VITALS
BODY MASS INDEX: 35.26 KG/M2 | RESPIRATION RATE: 17 BRPM | SYSTOLIC BLOOD PRESSURE: 103 MMHG | HEIGHT: 63 IN | OXYGEN SATURATION: 97 % | TEMPERATURE: 97.6 F | DIASTOLIC BLOOD PRESSURE: 66 MMHG | WEIGHT: 199 LBS | HEART RATE: 87 BPM

## 2022-09-25 PROCEDURE — 99024 POSTOP FOLLOW-UP VISIT: CPT | Performed by: OBSTETRICS & GYNECOLOGY

## 2022-09-25 RX ORDER — IBUPROFEN 600 MG/1
600 TABLET ORAL EVERY 6 HOURS
Refills: 0
Start: 2022-09-25

## 2022-09-25 RX ORDER — ACETAMINOPHEN 325 MG/1
650 TABLET ORAL EVERY 6 HOURS
Refills: 0
Start: 2022-09-25

## 2022-09-25 RX ORDER — FERROUS SULFATE TAB EC 324 MG (65 MG FE EQUIVALENT) 324 (65 FE) MG
324 TABLET DELAYED RESPONSE ORAL
Refills: 0
Start: 2022-09-25

## 2022-09-25 RX ADMIN — IBUPROFEN 600 MG: 600 TABLET, FILM COATED ORAL at 00:20

## 2022-09-25 RX ADMIN — ACETAMINOPHEN 325MG 650 MG: 325 TABLET ORAL at 04:13

## 2022-09-25 RX ADMIN — DOCUSATE SODIUM 100 MG: 100 CAPSULE, LIQUID FILLED ORAL at 09:05

## 2022-09-25 RX ADMIN — ACETAMINOPHEN 325MG 650 MG: 325 TABLET ORAL at 09:17

## 2022-09-25 RX ADMIN — IBUPROFEN 600 MG: 600 TABLET, FILM COATED ORAL at 06:09

## 2022-09-25 RX ADMIN — BACITRACIN ZINC, NEOMYCIN, POLYMYXIN B 1 SMALL APPLICATION: 400; 3.5; 5 OINTMENT TOPICAL at 09:06

## 2022-09-25 RX ADMIN — IBUPROFEN 600 MG: 600 TABLET, FILM COATED ORAL at 12:00

## 2022-09-25 NOTE — PROGRESS NOTES
Progress Note - OB/GYN  Post-Partum Physician Note   Sumeet Howe 32 y o  female MRN: [id number]  Unit/Bed#: -01 Encounter: [id number]    Patient is postop and postpartum day 2 from a  (repeat) with spinal anesthesia  Subjective:   Pain: controlled  Tolerating Oral Intake: yes  Voiding: yes  Flatus: yes  Bowel Movement: no  Ambulating: yes  Breastfeeding: Breastfeeding and Bottle feeding  Shortness of Breath: no  Leg Pain/Discomfort: no  Lochia: normal      Objective:   Vitals:    22 2238 22 2300 22 0300 22 0700   BP: (!) 86/52 103/69 91/63 103/66   BP Location: Left arm Right arm Left arm Left arm   Pulse: 94 85 74 87   Resp:  18 18 17   Temp:  97 5 °F (36 4 °C) 98 6 °F (37 °C) 97 6 °F (36 4 °C)   TempSrc:  Oral Temporal Temporal   SpO2:  97% 98% 97%   Weight:       Height:         No intake or output data in the 24 hours ending 22 1210    Physical Exam:  General: in no apparent distress  Abdomen: abdomen is soft without significant tenderness  Fundus: Firm, 2 below the umbilicus  Incision: C/D/I  Lower extremities: nontender, trace edema    Labs/Tests:   Lab Results   Component Value Date    WBC 10 74 (H) 2022    HGB 9 2 (L) 2022    HCT 27 1 (L) 2022    MCV 98 2022     2022       Brief OB Lab review:  ABO Grouping   Date Value Ref Range Status   2022 O  Final      Rh Factor   Date Value Ref Range Status   2022 Negative  Final     Rh Type   Date Value Ref Range Status   2022 Negative  Final     Comment:     Please note: Prior records for this patient's ABO / Rh type are not  available for additional verification        No results found for: ANTIBODYSCR  External Rubella IGG Quantitation   Date Value Ref Range Status   2022 Immune  Final         MEDS:   Current Facility-Administered Medications   Medication Dose Route Frequency    acetaminophen (TYLENOL) tablet 650 mg  650 mg Oral Q6H    acetaminophen (TYLENOL) tablet 650 mg  650 mg Oral Q4H PRN    benzocaine-menthol-lanolin-aloe (DERMOPLAST) 20-0 5 % topical spray 1 application  1 application Topical K6S PRN    calcium carbonate (TUMS) chewable tablet 1,000 mg  1,000 mg Oral Daily PRN    diphenhydrAMINE (BENADRYL) injection 25 mg  25 mg Intravenous Q6H PRN    docusate sodium (COLACE) capsule 100 mg  100 mg Oral BID    fentaNYL (SUBLIMAZE) injection 25 mcg  25 mcg Intravenous Q3 min PRN    hydrocortisone 1 % cream 1 application  1 application Topical Daily PRN    HYDROmorphone (DILAUDID) injection 0 5 mg  0 5 mg Intravenous Q2H PRN    ibuprofen (MOTRIN) tablet 600 mg  600 mg Oral Q6H    lactated ringers infusion  125 mL/hr Intravenous Continuous    morphine injection 4 mg  4 mg Intravenous Q5 Min PRN    nalbuphine (NUBAIN) injection 10 mg  10 mg Intravenous Q3H PRN    neomycin-bacitracin-polymyxin b (NEOSPORIN) ointment 1 small application  1 small application Topical TID    ondansetron (ZOFRAN) injection 4 mg  4 mg Intravenous Q8H PRN    ondansetron (ZOFRAN) injection 4 mg  4 mg Intravenous Once PRN    oxyCODONE (ROXICODONE) IR tablet 10 mg  10 mg Oral Q4H PRN    oxyCODONE (ROXICODONE) IR tablet 5 mg  5 mg Oral Q4H PRN    witch hazel-glycerin (TUCKS) topical pad 1 pad  1 pad Topical Q4H PRN     Invasive Devices  Timeline    None                 Assessment and Plan:  32y o  year-old [id number], postpartum day 2 status-post        Continue routine postpartum care  Encourage ambulation  Planning discharge today  Rh negative state in antepartum period  Baby blood type O neg - no Rhogam necessary  Postpartum anemia  Preop hgb 11 9g/dl, post op hgb 9 2g/dl  Reviewed IV iron vs PO iron  Pt wishes PO iron  Will start upon discharge        Guillermo Frias MD

## 2022-09-25 NOTE — PLAN OF CARE
Problem: PAIN - ADULT  Goal: Verbalizes/displays adequate comfort level or baseline comfort level  Description: Interventions:  - Encourage patient to monitor pain and request assistance  - Assess pain using appropriate pain scale  - Administer analgesics based on type and severity of pain and evaluate response  - Implement non-pharmacological measures as appropriate and evaluate response  - Consider cultural and social influences on pain and pain management  - Notify physician/advanced practitioner if interventions unsuccessful or patient reports new pain  Outcome: Progressing     Problem: INFECTION - ADULT  Goal: Absence or prevention of progression during hospitalization  Description: INTERVENTIONS:  - Assess and monitor for signs and symptoms of infection  - Monitor lab/diagnostic results  - Monitor all insertion sites, i e  indwelling lines, tubes, and drains  - Monitor endotracheal if appropriate and nasal secretions for changes in amount and color  - [place] appropriate cooling/warming therapies per order  - Administer medications as ordered  - Instruct and encourage patient and family to use good hand hygiene technique  - Identify and instruct in appropriate isolation precautions for identified infection/condition  Outcome: Progressing  Goal: Absence of fever/infection during neutropenic period  Description: INTERVENTIONS:  - Monitor WBC    Outcome: Progressing     Problem: SAFETY ADULT  Goal: Patient will remain free of falls  Description: INTERVENTIONS:  - Educate patient/family on patient safety including physical limitations  - Instruct patient to call for assistance with activity   - Consult OT/PT to assist with strengthening/mobility   - Keep Call bell within reach  - Keep bed low and locked with side rails adjusted as appropriate  - Keep care items and personal belongings within reach  - Initiate and maintain comfort rounds  - Make Fall Risk Sign visible to staff  - Offer Toileting every  Hours, in advance of need  - Initiate/Maintain alarm  - Obtain necessary fall risk management equipment:   - Apply yellow socks and bracelet for high fall risk patients  - Consider moving patient to room near nurses station  Outcome: Progressing  Goal: Maintain or return to baseline ADL function  Description: INTERVENTIONS:  -  Assess patient's ability to carry out ADLs; assess patient's baseline for ADL function and identify physical deficits which impact ability to perform ADLs (bathing, care of mouth/teeth, toileting, grooming, dressing, etc )  - Assess/evaluate cause of self-care deficits   - Assess range of motion  - Assess patient's mobility; develop plan if impaired  - Assess patient's need for assistive devices and provide as appropriate  - Encourage maximum independence but intervene and supervise when necessary  - Involve family in performance of ADLs  - Assess for home care needs following discharge   - Consider OT consult to assist with ADL evaluation and planning for discharge  - Provide patient education as appropriate  Outcome: Progressing  Goal: Maintains/Returns to pre admission functional level  Description: INTERVENTIONS:  - Perform BMAT or MOVE assessment daily    - Set and communicate daily mobility goal to care team and patient/family/caregiver  - Collaborate with rehabilitation services on mobility goals if consulted  - Perform Range of Motion  times a day  - Reposition patient every  hours    - Dangle patient  times a day  - Stand patient  times a day  - Ambulate patient  times a day  - Out of bed to chair  times a day   - Out of bed for meals  times a day  - Out of bed for toileting  - Record patient progress and toleration of activity level   Outcome: Progressing     Problem: Knowledge Deficit  Goal: Patient/family/caregiver demonstrates understanding of disease process, treatment plan, medications, and discharge instructions  Description: Complete learning assessment and assess knowledge base   Interventions:  - Provide teaching at level of understanding  - Provide teaching via preferred learning methods  Outcome: Progressing     Problem: DISCHARGE PLANNING  Goal: Discharge to home or other facility with appropriate resources  Description: INTERVENTIONS:  - Identify barriers to discharge w/patient and caregiver  - Arrange for needed discharge resources and transportation as appropriate  - Identify discharge learning needs (meds, wound care, etc )  - Arrange for interpretive services to assist at discharge as needed  - Refer to Case Management Department for coordinating discharge planning if the patient needs post-hospital services based on physician/advanced practitioner order or complex needs related to functional status, cognitive ability, or social support system  Outcome: Progressing     Problem: POSTPARTUM  Goal: Experiences normal postpartum course  Description: INTERVENTIONS:  - Monitor maternal vital signs  - Assess uterine involution and lochia  Outcome: Progressing  Goal: Appropriate maternal -  bonding  Description: INTERVENTIONS:  - Identify family support  - Assess for appropriate maternal/infant bonding   -Encourage maternal/infant bonding opportunities  - Referral to  or  as needed  Outcome: Progressing  Goal: Establishment of infant feeding pattern  Description: INTERVENTIONS:  - Assess breast/bottle feeding  - Refer to lactation as needed  Outcome: Progressing  Goal: Incision(s), wounds(s) or drain site(s) healing without S/S of infection  Description: INTERVENTIONS  - Assess and document dressing, incision, wound bed, drain sites and surrounding tissue  - Provide patient and family education  - Perform skin care/dressing changes every  Outcome: Progressing

## 2022-09-25 NOTE — DISCHARGE SUMMARY
Discharge Summary - OB/GYN   Lorena Perdomo 32 y o  female MRN: [id number]  Unit/Bed#: -01 Encounter: [id number]      Admission Date: 2022     Discharge Date: 22    Principal Diagnosis: History prior , [id number] Pregnancy at 40w0d    Secondary Diagnosis: postpartum anemia    Procedures: repeat  section, low transverse incision    Attending - Delivery:  , Louis Flaherty, DO         - Discharge: Seven Zapien MD    Hospital Course:     Lorena Perdomo is a 32 y o  [id number] at 40w0d wks who was initially admitted for repeat LTCS  She delivered a viable female  with weight 7lbs 8 3oz via repeat  section, low transverse incision  Apgars were 9 (1 min) and 9 (5 min)   went to  nursery  Patient tolerated the procedure well and was transferred to recovery in stable condition  Her postpartum course was uncomplicated  Preoperative hemoglobin was 11 9g/dl, postoperative was 9 2g/dl  Her postoperative pain was well controlled with oral analgesics  On day of discharge, she was ambulating and able to reasonably perform all ADLs  She was voiding and had appropriate bowel function  Pain was well controlled  She was discharged home on post-operative day #2, per patient request without complications  Patient was instructed to follow up with her OB as an outpatient and was given appropriate warnings to call provider if she develops signs of infection or uncontrolled pain  Condition at discharge: good     Discharge instructions/Information to patient and family:   See after visit summary for information provided to patient and family  Provisions for Follow-Up Care:  See after visit summary for information related to follow-up care and any pertinent home health orders  Disposition: Home    Planned Readmission: No    Discharge Medications: For a complete list of the patient's medications, please refer to her med rec      Seven Zapien MD

## 2022-09-26 NOTE — UTILIZATION REVIEW
BOTH MOTHER AND BABY DISCHARGED 2022      Inpatient Admission Authorization Request   Notification of Maternity/Delivery & [place] Birth Information for Admission   SERVICING FACILITY:   [address] [place] [address], [address]  Tax ID: [id number]  NPI: [id number]  Place of Service: Inpatient [address] [facility]  [address]  Place of Service Code: 24     ATTENDING PROVIDER:  Attending Name and NPI#: Leonid Alexander [[id number]]  Address: [address] [place] [address] [address]  Phone: [phone]     UTILIZATION REVIEW CONTACT:  Gabriela Ty Utilization   Network Utilization Review Department  Phone: [phone]  Fax [phone]  Email: [email or web address]     PHYSICIAN ADVISORY SERVICES:  FOR RCRV-UL-EFDX REVIEW - MEDICAL NECESSITY DENIAL  Phone: [phone]  Fax: [phone]  Email: [email or web address]     TYPE OF REQUEST:  Inpatient Status     ADMISSION INFORMATION:  ADMISSION DATE/TIME: 22  6:07 AM  PATIENT DIAGNOSIS CODE/DESCRIPTION:  Encounter for  delivery without indication [O82] The primary encounter diagnosis was 39 weeks gestation of pregnancy  Diagnoses of Maternal care due to low transverse uterine scar from previous  delivery, Chronic hepatitis C affecting antepartum care of mother, third trimester ([address] ), Rh negative state in antepartum period, Mild intermittent asthma without complication, S/P , and Postpartum anemia were also pertinent to this visit  1  39 weeks gestation of pregnancy    2  Maternal care due to low transverse uterine scar from previous  delivery    3  Chronic hepatitis C affecting antepartum care of mother, third trimester ([address] )    4  Rh negative state in antepartum period    5  Mild intermittent asthma without complication    6  S/P     7   Postpartum anemia      DISCHARGE DATE/TIME: 2022 12:25 PM   MOTHER AND  INFORMATION:  Mother: Jo Peck 1995   Delivering clinician:    OB History        3    Para   2    Term   2            AB   1    Living   2       SAB   1    IAB        Ectopic        Multiple   0    Live Births   2           Obstetric Comments   Menarche age 4           [place] Name & MRN:   Information for the patient's :  Yaya Olivo Girl Ina Valadez) [[id number]]     [place] Delivery Information:  Sex: female  Delivered 2022 8:31 AM by ; Gestational Age: 37w0d    [place] Measurements:  Weight: 7 lb 8 3 oz (3410 g); Height: 20"    APGAR 1 minute 5 minutes 10 minutes   Totals: 9 9       Birth Information: 32 y o  female MRN: [id number] Unit/Bed#: -01 Estimated Date of Delivery: 22  Birthweight: No birth weight on file  Gestational Age: <None> Delivery Type:       IMPORTANT INFORMATION:  Please contact Amy Vallejo directly with any questions or concerns regarding this request  Department voicemails are confidential     Send requests for admission clinical reviews, concurrent reviews, approvals, and administrative denials due to lack of clinical to fax [phone]

## 2022-10-01 LAB — PLACENTA IN STORAGE: NORMAL

## 2022-10-04 ENCOUNTER — TELEPHONE (OUTPATIENT)
Dept: OBGYN CLINIC | Facility: CLINIC | Age: 27
End: 2022-10-04

## 2022-10-04 NOTE — TELEPHONE ENCOUNTER
Agree with recommendations  Patient reported symptoms are appropriate for 1 week post-op       Jorge Luis Wilhelm  10/4/2022 2:05 PM

## 2022-10-04 NOTE — TELEPHONE ENCOUNTER
Patient called stating she over 1 week post partum  She had  A repeat LTCS 09/23/22  She noticed a lot of bad cramps and contraction-like pain  She states the incision site is clean, denies drainage, no redness, or sign of infection  The pain is below her incision site where her "uterus" is located  She is more physically active as she have a son to clive around and care for on top of her daughter  She is breast feeding  Denies bleeding  She wanting to know if it is normal or not  Advised as the uterus contacting to shrink in size, she can experience "after pains" which she can alternate between Tylenol and Motrin to help manage the pain  Also with being more physically active, she can irritate the area where the incision site is located as it is healing  Informed her will let Dr Alan Aden who is on call regarding symptoms to see if he has any other additional recommendations since Dr Jhonny Cunha is off today  She voiced appreciation  Dr Alan Aden please address

## 2022-10-04 NOTE — TELEPHONE ENCOUNTER
Informed pt of Dr Alan Aden agreed with the nurse's recommendations and symptoms are appropriate for 1 week post op  She verbalized understanding

## 2022-10-06 NOTE — UTILIZATION REVIEW
2ND TIME FAXED    BOTH MOTHER AND BABY DISCHARGED 2022      Inpatient Admission Authorization Request   Notification of Maternity/Delivery &  Birth Information for Admission   SERVICING FACILITY:   [address] [place] [address], [address]  Tax ID: [id number]  NPI: [id number]  Place of Service: Inpatient [address]  Place of Service Code: 24     ATTENDING PROVIDER:  Attending Name and NPI#: Heather Diego [[id number]]  Address: [address] [place] [address] [place], [address]  Phone: [phone]     UTILIZATION REVIEW CONTACT:  Kristopher [place] Utilization   Network Utilization Review Department  Phone: [phone]  Fax [phone]  Email: [email or web address]     PHYSICIAN ADVISORY SERVICES:  FOR MXEU-IA-IHIE REVIEW - MEDICAL NECESSITY DENIAL  Phone: [phone]  Fax: [phone]  Email: [email or web address]     TYPE OF REQUEST:  Inpatient Status     ADMISSION INFORMATION:  ADMISSION DATE/TIME: 22  6:07 AM  PATIENT DIAGNOSIS CODE/DESCRIPTION:  Encounter for  delivery without indication [O82] The primary encounter diagnosis was 39 weeks gestation of pregnancy  Diagnoses of Maternal care due to low transverse uterine scar from previous  delivery, Chronic hepatitis C affecting antepartum care of mother, third trimester ([address] ), Rh negative state in antepartum period, Mild intermittent asthma without complication, S/P , and Postpartum anemia were also pertinent to this visit  1  39 weeks gestation of pregnancy    2  Maternal care due to low transverse uterine scar from previous  delivery    3  Chronic hepatitis C affecting antepartum care of mother, third trimester ([address] )    4  Rh negative state in antepartum period    5  Mild intermittent asthma without complication    6  S/P     7   Postpartum anemia      DISCHARGE DATE/TIME: 2022 12:25 PM MOTHER AND  INFORMATION:  Mother: Esther Thompson 1995   Delivering clinician:    OB History        3    Para   2    Term   2            AB   1    Living   2       SAB   1    IAB        Ectopic        Multiple   0    Live Births   2           Obstetric Comments   Menarche age 4            Name & MRN:   Information for the patient's :  Casie Nobles [[id number]]      Delivery Information:  Sex: female  Delivered 2022 8:31 AM by ; Gestational Age: 37w0d    [place] Measurements:  Weight: 7 lb 8 3 oz (3410 g); Height: 20"    APGAR 1 minute 5 minutes 10 minutes   Totals: 9 9       Birth Information: 32 y o  female MRN: [id number] Unit/Bed#: -01 Estimated Date of Delivery: 22  Birthweight: No birth weight on file  Gestational Age: <None> Delivery Type:       IMPORTANT INFORMATION:  Please contact Amy Vallejo directly with any questions or concerns regarding this request  Department voicemails are confidential     Send requests for admission clinical reviews, concurrent reviews, approvals, and administrative denials due to lack of clinical to fax [phone]

## 2022-11-10 ENCOUNTER — POSTPARTUM VISIT (OUTPATIENT)
Dept: OBGYN CLINIC | Facility: CLINIC | Age: 27
End: 2022-11-10

## 2022-11-10 VITALS
HEIGHT: 64 IN | DIASTOLIC BLOOD PRESSURE: 58 MMHG | WEIGHT: 172.6 LBS | BODY MASS INDEX: 29.47 KG/M2 | SYSTOLIC BLOOD PRESSURE: 90 MMHG

## 2022-11-10 DIAGNOSIS — Z30.09 ENCOUNTER FOR OTHER GENERAL COUNSELING OR ADVICE ON CONTRACEPTION: ICD-10-CM

## 2022-11-10 DIAGNOSIS — Z09 POSTOPERATIVE EXAMINATION: ICD-10-CM

## 2022-11-10 NOTE — PROGRESS NOTES
Routine Post Partum Visit  [address] [facility], [address], [place], [address]    Assessment/Plan:  Sho Blevins is a 32y o  year old [id number] who presents for postpartum visit  Routine Postpartum Care  1  Normal postpartum exam  2  Contraception: condoms  3  Depression Screen: PPD screen score: 8; positive   4  Feeding:  She is breast feeding exclusively  5  Psychosocial support: present  6  Cervical cancer screening Up to Date, next due before 3/11/2023  7  Follow up in: 3 months for wellness visit, or as needed  Additional Problems:  1  Postpartum exam    2  Postpartum depression  -  Denies SI/HI, desires to start Zoloft   -     sertraline (Zoloft) 50 mg tablet; Take 1 tablet (50 mg total) by mouth daily    3  Postoperative examination    4  Encounter for other general counseling or advice on contraception      -  Patient states she plans to use condoms  Did not like how she felt with IUD (Progesterone) in the past      Next visit: 3 months Wellness    Subjective:     CC: Postpartum visit    Radha Martines is a 32 y o  y o  female [id number] who presents for a postpartum visit  She is 6 weeks postpartum following a low transverse  section on 22 at 40 weeks  Outcome: repeat  section, low transverse incision, n/a  Anesthesia: epidural  Postpartum course has been uncomplicated  Baby's course has been uncomplicated  Baby is feeding by She is breast feeding exclusively        Bleeding no bleeding  Bowel function is normal  Bladder function is normal  Patient is not sexually active since delivery  Contraception method is condoms  Postpartum depression screening: positive      The following portions of the patient's history were reviewed and updated as appropriate: allergies, current medications, past family history, past medical history, obstetric history, gynecologic history, past social history, past surgical history and problem list       Objective:  BP 90/58 Ht 5' 4 25" (1 632 m)   Wt 78 3 kg (172 lb 9 6 oz)   LMP 12/17/2021 (Exact Date)   Breastfeeding Yes   BMI 29 40 kg/m²   Pregravid Weight/BMI: 73 9 kg (163 lb) (BMI 28 88)  Current Weight: 78 3 kg (172 lb 9 6 oz)   Total Weight Gain: 16 3 kg (36 lb)     General: Well appearing, no distress  Mood and affect: Appropriate    Abdomen: Soft, nontender  Incision: c/d/i  Vulva: normal  Vagina: normal, no abnormal discharge  Cervix: closed, no bleeding  Uterus: non-tender, normal size  Adnexa: no masses, no tenderness

## 2023-03-30 ENCOUNTER — TELEPHONE (OUTPATIENT)
Dept: OBGYN CLINIC | Facility: CLINIC | Age: 28
End: 2023-03-30

## 2023-03-30 NOTE — TELEPHONE ENCOUNTER
[facility] pharmacy,[place] is requesting 90 day Rx for Zoloft 50 mg  Original Rx was 30 days with 11 refills  Sent message to Dr Sarthak Haji     ( will send message to  to contact pt to schedule [place])

## 2023-07-12 ENCOUNTER — ANNUAL EXAM (OUTPATIENT)
Dept: OBGYN CLINIC | Facility: CLINIC | Age: 28
End: 2023-07-12
Payer: COMMERCIAL

## 2023-07-12 VITALS
HEIGHT: 65 IN | BODY MASS INDEX: 30.46 KG/M2 | WEIGHT: 182.8 LBS | DIASTOLIC BLOOD PRESSURE: 68 MMHG | SYSTOLIC BLOOD PRESSURE: 100 MMHG

## 2023-07-12 DIAGNOSIS — Z12.4 SCREENING FOR CERVICAL CANCER: ICD-10-CM

## 2023-07-12 DIAGNOSIS — Z01.419 ENCNTR FOR GYN EXAM (GENERAL) (ROUTINE) W/O ABN FINDINGS: Primary | ICD-10-CM

## 2023-07-12 PROCEDURE — 99395 PREV VISIT EST AGE 18-39: CPT | Performed by: OBSTETRICS & GYNECOLOGY
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
## 2023-07-17 LAB
CYTOLOGIST CVX/VAG CYTO: NORMAL
DX ICD CODE: NORMAL
Lab: NORMAL
OTHER STN SPEC: NORMAL
OTHER STN SPEC: NORMAL
PATH REPORT.FINAL DX SPEC: NORMAL
SL AMB NOTE:: NORMAL
SL AMB SPECIMEN ADEQUACY: NORMAL
SL AMB TEST METHODOLOGY: NORMAL

## 2024-01-11 ENCOUNTER — OFFICE VISIT (OUTPATIENT)
Dept: RHEUMATOLOGY | Facility: CLINIC | Age: 29
End: 2024-01-11
Payer: COMMERCIAL

## 2024-01-11 VITALS
DIASTOLIC BLOOD PRESSURE: 70 MMHG | WEIGHT: 190 LBS | SYSTOLIC BLOOD PRESSURE: 108 MMHG | HEART RATE: 74 BPM | OXYGEN SATURATION: 98 % | HEIGHT: 63 IN | BODY MASS INDEX: 33.66 KG/M2

## 2024-01-11 DIAGNOSIS — M02.30 REACTIVE ARTHRITIS, UNSPECIFIED SITE (HCC): ICD-10-CM

## 2024-01-11 DIAGNOSIS — M19.90 INFLAMMATORY ARTHRITIS: Primary | ICD-10-CM

## 2024-01-11 PROCEDURE — 99204 OFFICE O/P NEW MOD 45 MIN: CPT | Performed by: INTERNAL MEDICINE

## 2024-01-11 RX ORDER — IBUPROFEN 800 MG/1
800 TABLET ORAL EVERY 8 HOURS PRN
Qty: 90 TABLET | Refills: 6 | Status: SHIPPED | OUTPATIENT
Start: 2024-01-11

## 2024-01-11 RX ORDER — MAGNESIUM 30 MG
TABLET ORAL DAILY
COMMUNITY

## 2024-01-11 RX ORDER — ZINC OXIDE 13 %
CREAM (GRAM) TOPICAL
COMMUNITY

## 2024-01-11 NOTE — PATIENT INSTRUCTIONS
"Do labs  Do hand and SI joint x-rays  Can take ibuprofen 3 times a day as needed for joint pain, take with food    Return to clinic in 6 month    Reactive Arthritis    What is reactive arthritis? -- Reactive arthritis is a kind of arthritis that happens after certain infections. It causes pain and swelling in joints.  Reactive arthritis usually affects people who have or just had:  ?Food poisoning or another kind of infection of the intestines  ?An infection that you catch through sex   In the past, reactive arthritis was sometimes called \"Ingrid syndrome.\"  What are the symptoms of reactive arthritis? -- The main symptoms of reactive arthritis are pain and swelling in the joints. These usually happen 1 to 4 weeks after an infection. In most cases, the symptoms affect only a few joints, usually in the knees, ankles, or feet.  Other symptoms might include:  ?Pain in the tendons in the feet and ankles (tendons are tough bands of tissue that connect muscles to bones)  ?Irritation of the eye called \"conjunctivitis\" (also known as pink eye)  ?Pain when urinating  Is there a test for reactive arthritis? -- No. There is no test. But if your doctor or nurse can figure out what type of germ caused your infection, he or she should be able to tell if you have reactive arthritis. Your doctor or nurse can test your stool (bowel movements) or urine to look for certain kinds of germs.  If your doctor or nurse can't tell what germs caused your infection, he or she will study your symptoms to decide how likely it is that you have reactive arthritis.  How is reactive arthritis treated? -- The symptoms of reactive arthritis are treated with medicines, including:  ?NSAIDs - This is a large group of medicines that includes ibuprofen (sample brand names: Advil, Motrin) and naproxen (sample brand name: Aleve). Your doctor or nurse might prescribe a dose higher than you would normally take to relieve pain.  ?Other medicines - If NSAIDs do " not help your symptoms, your doctor or nurse might give you a shot of steroids. Steroids help reduce inflammation. These are not the same as the steroids some athletes take illegally. Your doctor might also give you more steroids to take at home.  There are also other medicines that might help if your symptoms do not get better with NSAIDs or steroids.  ?Eye drops - Special drops can help relieve redness and irritation in your eyes. But if you have eye pain or trouble seeing, visit an eye doctor to make sure you don't have a more serious problem.  Antibiotics do not usually help with the joint symptoms of reactive arthritis. Even so, your doctor or nurse might prescribe them if you still have an infection.  When will I feel better? -- Most people with reactive arthritis get better quickly. Some people continue to notice symptoms, either constantly or just once in a while.  If your back gets very stiff and sore, see your doctor or nurse. This might mean that your reactive arthritis has turned into a more serious problem
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
## 2024-01-18 ENCOUNTER — HOSPITAL ENCOUNTER (OUTPATIENT)
Dept: RADIOLOGY | Facility: HOSPITAL | Age: 29
End: 2024-01-18
Payer: COMMERCIAL

## 2024-01-18 DIAGNOSIS — M19.90 INFLAMMATORY ARTHRITIS: ICD-10-CM

## 2024-01-18 PROCEDURE — 73130 X-RAY EXAM OF HAND: CPT

## 2024-01-18 PROCEDURE — 72200 X-RAY EXAM SI JOINTS: CPT

## 2024-02-02 DIAGNOSIS — R76.8 POSITIVE ANA (ANTINUCLEAR ANTIBODY): Primary | ICD-10-CM

## 2024-04-26 ENCOUNTER — OFFICE VISIT (OUTPATIENT)
Dept: ENDOCRINOLOGY | Facility: HOSPITAL | Age: 29
End: 2024-04-26
Payer: COMMERCIAL

## 2024-04-26 VITALS
WEIGHT: 174 LBS | BODY MASS INDEX: 30.83 KG/M2 | DIASTOLIC BLOOD PRESSURE: 80 MMHG | HEART RATE: 99 BPM | SYSTOLIC BLOOD PRESSURE: 118 MMHG | OXYGEN SATURATION: 99 % | HEIGHT: 63 IN

## 2024-04-26 DIAGNOSIS — E06.3 HASHIMOTO'S THYROIDITIS: Primary | ICD-10-CM

## 2024-04-26 PROCEDURE — 99244 OFF/OP CNSLTJ NEW/EST MOD 40: CPT | Performed by: STUDENT IN AN ORGANIZED HEALTH CARE EDUCATION/TRAINING PROGRAM

## 2024-04-26 RX ORDER — PHENTERMINE HYDROCHLORIDE 37.5 MG/1
TABLET ORAL
COMMUNITY
Start: 2024-02-27
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
## 2024-06-21 LAB
T4 FREE SERPL-MCNC: 1.44 NG/DL (ref 0.82–1.77)
TSH SERPL DL<=0.005 MIU/L-ACNC: 2.92 UIU/ML (ref 0.45–4.5)

## 2024-07-31 ENCOUNTER — OFFICE VISIT (OUTPATIENT)
Dept: ENDOCRINOLOGY | Facility: HOSPITAL | Age: 29
End: 2024-07-31
Payer: COMMERCIAL

## 2024-07-31 VITALS
WEIGHT: 169 LBS | BODY MASS INDEX: 29.95 KG/M2 | HEART RATE: 90 BPM | HEIGHT: 63 IN | DIASTOLIC BLOOD PRESSURE: 80 MMHG | SYSTOLIC BLOOD PRESSURE: 118 MMHG

## 2024-07-31 DIAGNOSIS — E06.3 HASHIMOTO'S THYROIDITIS: Primary | ICD-10-CM

## 2024-07-31 PROCEDURE — 99213 OFFICE O/P EST LOW 20 MIN: CPT | Performed by: STUDENT IN AN ORGANIZED HEALTH CARE EDUCATION/TRAINING PROGRAM
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
## 2024-08-30 NOTE — ASSESSMENT & PLAN NOTE
If she can try to make sure the scan is done on the same machine she had it done previously, this would be preferable.  Thanks!   Reviewed Adacel vaccine next visit

## 2025-01-28 ENCOUNTER — RESULTS FOLLOW-UP (OUTPATIENT)
Dept: ENDOCRINOLOGY | Facility: HOSPITAL | Age: 30
End: 2025-01-28

## 2025-01-28 LAB
T4 FREE SERPL-MCNC: 1.19 NG/DL (ref 0.82–1.77)
THYROPEROXIDASE AB SERPL-ACNC: 286.9 IU/ML
TSH SERPL DL<=0.005 MIU/L-ACNC: 1.91 UIU/ML (ref 0.45–4.5)

## 2025-01-30 ENCOUNTER — OFFICE VISIT (OUTPATIENT)
Dept: ENDOCRINOLOGY | Facility: HOSPITAL | Age: 30
End: 2025-01-30

## 2025-01-30 VITALS
HEIGHT: 63 IN | HEART RATE: 78 BPM | SYSTOLIC BLOOD PRESSURE: 104 MMHG | WEIGHT: 173 LBS | BODY MASS INDEX: 30.65 KG/M2 | DIASTOLIC BLOOD PRESSURE: 80 MMHG

## 2025-01-30 DIAGNOSIS — E06.3 HASHIMOTO'S THYROIDITIS: Primary | ICD-10-CM

## 2025-01-30 DIAGNOSIS — R53.83 OTHER FATIGUE: ICD-10-CM

## 2025-01-30 DIAGNOSIS — M25.50 ARTHRALGIA, UNSPECIFIED JOINT: ICD-10-CM

## 2025-01-30 PROCEDURE — 99214 OFFICE O/P EST MOD 30 MIN: CPT | Performed by: PHYSICIAN ASSISTANT

## 2025-01-30 NOTE — PATIENT INSTRUCTIONS
Get lab work at this time.    No need for thyroid treatment.    Call with any concerns or questions.    Follow up in 1 year with labs.
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
## 2025-02-20 ENCOUNTER — RESULTS FOLLOW-UP (OUTPATIENT)
Dept: ENDOCRINOLOGY | Facility: HOSPITAL | Age: 30
End: 2025-02-20

## 2025-02-20 LAB
ACTH PLAS-MCNC: 13.8 PG/ML (ref 7.2–63.3)
CORTIS AM PEAK SERPL-MCNC: 9.7 UG/DL (ref 6.2–19.4)
IGF-I SERPL-MCNC: 180 NG/ML (ref 84–281)
PROLACTIN SERPL-MCNC: 10.4 NG/ML (ref 4.8–33.4)

## (undated) DEVICE — SPONGE 4 X 4 XRAY 16 PLY STRL LF RFD

## (undated) DEVICE — STAPLER INSORB SUBCUTICULAR 30 SINGLE USE

## (undated) DEVICE — GLOVE INDICATOR PI UNDERGLOVE SZ 7 BLUE

## (undated) DEVICE — BAG DECANTER

## (undated) DEVICE — CAST PADDING 3 IN UNSTERILE

## (undated) DEVICE — BETHLEHEM UNIVERSAL MINOR VAG: Brand: CARDINAL HEALTH

## (undated) DEVICE — CAST PADDING 4 IN STERILE

## (undated) DEVICE — BERKELEY 1/2" (13MM) COLLECTION SET, DISPOSABLE W/HANDLE AND TAPERED FITTING TUBING, 6 FT (183 CM): Brand: BERKELEY

## (undated) DEVICE — SUT ETHILON 4-0 PS-2 18 IN 1667H

## (undated) DEVICE — SUT VICRYL 0 CT-1 27 IN J260H

## (undated) DEVICE — ABDOMINAL PAD: Brand: DERMACEA

## (undated) DEVICE — 3M™ STERI-STRIP™ COMPOUND BENZOIN TINCTURE 40 BAGS/CARTON 4 CARTONS/CASE C1544: Brand: 3M™ STERI-STRIP™

## (undated) DEVICE — INTENDED FOR TISSUE SEPARATION, AND OTHER PROCEDURES THAT REQUIRE A SHARP SURGICAL BLADE TO PUNCTURE OR CUT.: Brand: BARD-PARKER SAFETY BLADES SIZE 15, STERILE

## (undated) DEVICE — Device

## (undated) DEVICE — GLOVE PI ULTRA TOUCH SZ.7.0

## (undated) DEVICE — SKIN MARKER DUAL TIP WITH RULER CAP, FLEXIBLE RULER AND LABELS: Brand: DEVON

## (undated) DEVICE — SCD SEQUENTIAL COMPRESSION COMFORT SLEEVE MEDIUM KNEE LENGTH: Brand: KENDALL SCD

## (undated) DEVICE — HEMOSTAT POWDER ADSORB SURGICEL 3GM

## (undated) DEVICE — GLOVE INDICATOR PI UNDERGLOVE SZ 8 BLUE

## (undated) DEVICE — CHLORAPREP HI-LITE 26ML ORANGE

## (undated) DEVICE — GLOVE PI ULTRA TOUCH SZ.6.5

## (undated) DEVICE — GLOVE SRG BIOGEL 6

## (undated) DEVICE — GLOVE INDICATOR PI UNDERGLOVE SZ 6.5 BLUE

## (undated) DEVICE — GAUZE SPONGES,16 PLY: Brand: CURITY

## (undated) DEVICE — PENCIL ELECTROSURG E-Z CLEAN -0035H

## (undated) DEVICE — PACK C-SECTION PBDS

## (undated) DEVICE — GLOVE SRG BIOGEL 7.5

## (undated) DEVICE — CURETTE VACURETTE .CRVD 16MM

## (undated) DEVICE — INTENDED FOR TISSUE SEPARATION, AND OTHER PROCEDURES THAT REQUIRE A SHARP SURGICAL BLADE TO PUNCTURE OR CUT.: Brand: BARD-PARKER ® CARBON RIB-BACK BLADES

## (undated) DEVICE — D + E TUBING W /FILTER

## (undated) DEVICE — PVC URETHRAL CATHETER: Brand: DOVER

## (undated) DEVICE — 3M™ STERI-STRIP™ REINFORCED ADHESIVE SKIN CLOSURES, R1547, 1/2 IN X 4 IN (12 MM X 100 MM), 6 STRIPS/ENVELOPE: Brand: 3M™ STERI-STRIP™

## (undated) DEVICE — STERILE BETHLEHEM PLASTIC HAND: Brand: CARDINAL HEALTH

## (undated) DEVICE — TELFA NON-ADHERENT ABSORBENT DRESSING: Brand: TELFA

## (undated) DEVICE — ACE WRAP 4 IN UNSTERILE

## (undated) DEVICE — GLOVE SRG BIOGEL ORTHOPEDIC 7.5

## (undated) DEVICE — PAD GROUNDING ADULT